# Patient Record
Sex: FEMALE | Race: WHITE | Employment: FULL TIME | URBAN - METROPOLITAN AREA
[De-identification: names, ages, dates, MRNs, and addresses within clinical notes are randomized per-mention and may not be internally consistent; named-entity substitution may affect disease eponyms.]

---

## 2017-01-29 ENCOUNTER — HOSPITAL ENCOUNTER (EMERGENCY)
Age: 27
Discharge: HOME | End: 2017-01-29
Attending: EMERGENCY MEDICINE
Payer: MEDICAID

## 2017-01-29 VITALS
OXYGEN SATURATION: 98 % | WEIGHT: 110.89 LBS | HEART RATE: 86 BPM | DIASTOLIC BLOOD PRESSURE: 67 MMHG | SYSTOLIC BLOOD PRESSURE: 96 MMHG | RESPIRATION RATE: 16 BRPM | BODY MASS INDEX: 17.82 KG/M2 | TEMPERATURE: 97.7 F | HEIGHT: 66 IN

## 2017-01-29 DIAGNOSIS — L02.91 ABSCESS: Primary | ICD-10-CM

## 2017-01-29 PROCEDURE — 99282 EMERGENCY DEPT VISIT SF MDM: CPT

## 2017-01-29 PROCEDURE — 75810000289 HC I&D ABSCESS SIMP/COMP/MULT

## 2017-01-29 RX ORDER — OXYCODONE AND ACETAMINOPHEN 5; 325 MG/1; MG/1
1 TABLET ORAL
Qty: 10 TAB | Refills: 0 | Status: SHIPPED | OUTPATIENT
Start: 2017-01-29 | End: 2017-04-04

## 2017-01-29 RX ORDER — DOXYCYCLINE HYCLATE 100 MG
100 TABLET ORAL 2 TIMES DAILY
Qty: 14 TAB | Refills: 0 | Status: SHIPPED | OUTPATIENT
Start: 2017-01-29 | End: 2017-02-05

## 2017-01-29 NOTE — DISCHARGE INSTRUCTIONS

## 2017-01-29 NOTE — ED PROVIDER NOTES
HPI Comments: Augusto Ortega is a 33 y.o. female who presents ambulatory to [facility] ED with cc of progressively worsening redness, swelling, and pain to the skin of the right upper back. She states that symptoms have been worsening for the past 6 months, but \"tripled in size\" over the past week. Pt denies any drainage from the area and notes no episodes of similar symptoms in the past. She states that pain is exacerbated by palpation, but she attempted several times \"to squeeze and pop it,\" with no relief. Pt specifically denies any abdominal pain, vomiting, fever, chills, or chest pain. PCP:None    PMHx: asthma  Social Hx: + smoking; - EtOH; - illicit drug use    There are no other complains, changes, or physical findings at this time. The history is provided by the patient. No  was used. Past Medical History:   Diagnosis Date    Abnormal Papanicolaou smear of cervix      no tx required    Asthma      last attack 5 months ago    Other unknown and unspecified cause of morbidity or mortality      Heroin use in early pregnancy       Past Surgical History:   Procedure Laterality Date    Hx other surgical       wisdom teeth    Hx other surgical       d&c 2011         History reviewed. No pertinent family history. Social History     Social History    Marital status: SINGLE     Spouse name: N/A    Number of children: N/A    Years of education: N/A     Occupational History    Not on file.      Social History Main Topics    Smoking status: Current Every Day Smoker     Packs/day: 0.25     Years: 2.00     Last attempt to quit: 3/9/2015    Smokeless tobacco: Never Used    Alcohol use No    Drug use: Yes     Special: Heroin      Comment: In early pregnancy, switched to suboxone around 20 weeks    Sexual activity: Yes     Partners: Male     Other Topics Concern    Not on file     Social History Narrative         ALLERGIES: Sulfa (sulfonamide antibiotics)    Review of Systems Constitutional: Negative for chills and fever. HENT: Negative for congestion. Eyes: Negative for visual disturbance. Respiratory: Negative for chest tightness. Cardiovascular: Negative for chest pain and leg swelling. Gastrointestinal: Negative for abdominal pain and vomiting. Endocrine: Negative for polyuria. Genitourinary: Negative for dysuria and frequency. Musculoskeletal: Negative for myalgias. Skin: Positive for color change. Allergic/Immunologic: Negative for immunocompromised state. Neurological: Negative for numbness. Vitals:    01/29/17 1505   BP: 96/67   Pulse: 86   Resp: 16   Temp: 97.7 °F (36.5 °C)   SpO2: 98%   Weight: 50.3 kg (110 lb 14.3 oz)   Height: 5' 6\" (1.676 m)            Physical Exam   Constitutional: She is oriented to person, place, and time. She appears well-developed and well-nourished. No distress. HENT:   Head: Normocephalic and atraumatic. Right Ear: External ear normal.   Left Ear: External ear normal.   Nose: Nose normal.   Mouth/Throat: Oropharynx is clear and moist. No oropharyngeal exudate. Eyes: Conjunctivae and EOM are normal. Pupils are equal, round, and reactive to light. Right eye exhibits no discharge. Left eye exhibits no discharge. No scleral icterus. Neck: Normal range of motion. Neck supple. No JVD present. No tracheal deviation present. Cardiovascular: Normal rate, regular rhythm, normal heart sounds and intact distal pulses. Exam reveals no gallop and no friction rub. No murmur heard. Pulmonary/Chest: Effort normal and breath sounds normal. No respiratory distress. She has no wheezes. She has no rales. She exhibits no tenderness. Abdominal: Soft. Bowel sounds are normal. She exhibits no distension and no mass. There is no tenderness. There is no rebound and no guarding. Musculoskeletal: Normal range of motion. She exhibits no edema or tenderness. Lymphadenopathy:     She has no cervical adenopathy.    Neurological: She is alert and oriented to person, place, and time. She has normal reflexes. No cranial nerve deficit. She exhibits normal muscle tone. Coordination normal.   Skin: Skin is warm and dry. She is not diaphoretic. Fluctuant mass to the right shoulder, consistent with an abscess   Psychiatric: She has a normal mood and affect. Her behavior is normal. Judgment and thought content normal.   Nursing note and vitals reviewed. MDM  Number of Diagnoses or Management Options  Abscess:   Diagnosis management comments: DDx: abscess, cellulitis, folliculitis       Amount and/or Complexity of Data Reviewed  Review and summarize past medical records: yes    Patient Progress  Patient progress: stable    ED Course       Procedures    Procedure Note - Incision and Drainage:   3:23 PM  Performed by: [employer]  Complexity: complex  Skin prepped with Betadine. Sterile field established. Anesthesia achieved with 7 mLs of 50-50 Lidocaine 1% with epinephrine and Bupivacaine 0.5% with epinephrine using a local infiltration. Abscess to back was incised with # 11 blade, and a copious amount of purulent green drainage was expressed. Wound probed and irrigated. Area was packed using 0.25 inch iodoform gauze. Sterile dressing applied. Estimated blood loss: scant  The procedure took 1-15 minutes, and pt tolerated well. Written by Patria Guthrie ED Scribhannah, as dictated by [employer] VITALS:  Patient Vitals for the past 12 hrs:   Temp Pulse Resp BP SpO2   01/29/17 1505 97.7 °F (36.5 °C) 86 16 96/67 98 %     IMPRESSION:  1. Abscess        PLAN:  1. Current Discharge Medication List      START taking these medications    Details   oxyCODONE-acetaminophen (PERCOCET) 5-325 mg per tablet Take 1 Tab by mouth every six (6) hours as needed for Pain. Max Daily Amount: 4 Tabs. Qty: 10 Tab, Refills: 0      doxycycline (VIBRA-TABS) 100 mg tablet Take 1 Tab by mouth two (2) times a day for 7 days.   Qty: 14 Tab, Refills: 0           2. Follow-up Information     Follow up With Details Comments Contact Info    None In 2 days As needed None (395) Patient stated that they have no PCP      None In 2 days As needed None (395) Patient stated that they have no PCP          3. Return to ED if worse     Discharge Note:  3:36 PM  The patient has been re-evaluated and is ready for discharge. Reviewed available results with patient. Counseled patient on diagnosis and care plan. Patient has expressed understanding, and all questions have been answered. Patient agrees with plan and agrees to follow up as recommended, or to return to the ED if their symptoms worsen. Discharge instructions have been provided and explained to the patient, along with reasons to return to the ED. This note is prepared by Senthil Hale, acting as Scribe for [employer] MANNY Platt: The scribe's documentation has been prepared under my direction and personally reviewed by me in its entirety. I confirm that the note above accurately reflects all work, treatment, procedures, and medical decision making performed by me.

## 2017-01-29 NOTE — ED NOTES
Job Cano [place] reviewed discharge instructions with the patient. The patient verbalized understanding. All questions and concerns were addressed. The patient is discharged ambulatory in the care of family members with instructions and prescriptions in hand. Pt is alert and oriented x 4. Respirations are clear and unlabored.

## 2017-04-04 ENCOUNTER — HOSPITAL ENCOUNTER (EMERGENCY)
Age: 27
Discharge: HOME | End: 2017-04-04
Attending: EMERGENCY MEDICINE
Payer: COMMERCIAL

## 2017-04-04 VITALS
RESPIRATION RATE: 18 BRPM | BODY MASS INDEX: 16.62 KG/M2 | SYSTOLIC BLOOD PRESSURE: 123 MMHG | TEMPERATURE: 99.4 F | OXYGEN SATURATION: 100 % | DIASTOLIC BLOOD PRESSURE: 87 MMHG | HEART RATE: 115 BPM | WEIGHT: 103.4 LBS | HEIGHT: 66 IN

## 2017-04-04 DIAGNOSIS — B34.9 VIRAL SYNDROME: ICD-10-CM

## 2017-04-04 DIAGNOSIS — R19.7 NAUSEA VOMITING AND DIARRHEA: Primary | ICD-10-CM

## 2017-04-04 DIAGNOSIS — R11.2 NAUSEA VOMITING AND DIARRHEA: Primary | ICD-10-CM

## 2017-04-04 LAB
ALBUMIN SERPL BCP-MCNC: 4.2 G/DL (ref 3.5–5)
ALBUMIN/GLOB SERPL: 1 {RATIO} (ref 1.1–2.2)
ALP SERPL-CCNC: 126 U/L (ref 45–117)
ALT SERPL-CCNC: 19 U/L (ref 12–78)
ANION GAP BLD CALC-SCNC: 10 MMOL/L (ref 5–15)
APPEARANCE UR: ABNORMAL
AST SERPL W P-5'-P-CCNC: 16 U/L (ref 15–37)
BACTERIA URNS QL MICRO: ABNORMAL /HPF
BASOPHILS # BLD AUTO: 0 K/UL (ref 0–0.1)
BASOPHILS # BLD: 0 % (ref 0–1)
BILIRUB SERPL-MCNC: 0.5 MG/DL (ref 0.2–1)
BILIRUB UR QL: NEGATIVE
BUN SERPL-MCNC: 14 MG/DL (ref 6–20)
BUN/CREAT SERPL: 18 (ref 12–20)
CALCIUM SERPL-MCNC: 9.7 MG/DL (ref 8.5–10.1)
CHLORIDE SERPL-SCNC: 105 MMOL/L (ref 97–108)
CO2 SERPL-SCNC: 26 MMOL/L (ref 21–32)
COLOR UR: ABNORMAL
CREAT SERPL-MCNC: 0.8 MG/DL (ref 0.55–1.02)
EOSINOPHIL # BLD: 0 K/UL (ref 0–0.4)
EOSINOPHIL NFR BLD: 0 % (ref 0–7)
EPITH CASTS URNS QL MICRO: ABNORMAL /LPF
ERYTHROCYTE [DISTWIDTH] IN BLOOD BY AUTOMATED COUNT: 13.8 % (ref 11.5–14.5)
FLUAV AG NPH QL IA: NEGATIVE
FLUBV AG NOSE QL IA: NEGATIVE
GLOBULIN SER CALC-MCNC: 4.3 G/DL (ref 2–4)
GLUCOSE SERPL-MCNC: 106 MG/DL (ref 65–100)
GLUCOSE UR STRIP.AUTO-MCNC: NEGATIVE MG/DL
HCG UR QL: NEGATIVE
HCT VFR BLD AUTO: 42.5 % (ref 35–47)
HGB BLD-MCNC: 14.3 G/DL (ref 11.5–16)
HGB UR QL STRIP: NEGATIVE
KETONES UR QL STRIP.AUTO: NEGATIVE MG/DL
LEUKOCYTE ESTERASE UR QL STRIP.AUTO: ABNORMAL
LYMPHOCYTES # BLD AUTO: 23 % (ref 12–49)
LYMPHOCYTES # BLD: 1.7 K/UL (ref 0.8–3.5)
MCH RBC QN AUTO: 28.7 PG (ref 26–34)
MCHC RBC AUTO-ENTMCNC: 33.6 G/DL (ref 30–36.5)
MCV RBC AUTO: 85.2 FL (ref 80–99)
MONOCYTES # BLD: 0.7 K/UL (ref 0–1)
MONOCYTES NFR BLD AUTO: 9 % (ref 5–13)
MUCOUS THREADS URNS QL MICRO: ABNORMAL /LPF
NEUTS SEG # BLD: 4.9 K/UL (ref 1.8–8)
NEUTS SEG NFR BLD AUTO: 68 % (ref 32–75)
NITRITE UR QL STRIP.AUTO: NEGATIVE
PH UR STRIP: 6.5 [PH] (ref 5–8)
PLATELET # BLD AUTO: 208 K/UL (ref 150–400)
POTASSIUM SERPL-SCNC: 4 MMOL/L (ref 3.5–5.1)
PROT SERPL-MCNC: 8.5 G/DL (ref 6.4–8.2)
PROT UR STRIP-MCNC: ABNORMAL MG/DL
RBC # BLD AUTO: 4.99 M/UL (ref 3.8–5.2)
RBC #/AREA URNS HPF: ABNORMAL /HPF (ref 0–5)
SODIUM SERPL-SCNC: 141 MMOL/L (ref 136–145)
SP GR UR REFRACTOMETRY: 1.02 (ref 1–1.03)
UROBILINOGEN UR QL STRIP.AUTO: 1 EU/DL (ref 0.2–1)
WBC # BLD AUTO: 7.3 K/UL (ref 3.6–11)
WBC URNS QL MICRO: ABNORMAL /HPF (ref 0–4)

## 2017-04-04 PROCEDURE — 74011250637 HC RX REV CODE- 250/637: Performed by: EMERGENCY MEDICINE

## 2017-04-04 PROCEDURE — 81025 URINE PREGNANCY TEST: CPT | Performed by: EMERGENCY MEDICINE

## 2017-04-04 PROCEDURE — 74011250636 HC RX REV CODE- 250/636: Performed by: EMERGENCY MEDICINE

## 2017-04-04 PROCEDURE — 87804 INFLUENZA ASSAY W/OPTIC: CPT | Performed by: EMERGENCY MEDICINE

## 2017-04-04 PROCEDURE — 36415 COLL VENOUS BLD VENIPUNCTURE: CPT | Performed by: EMERGENCY MEDICINE

## 2017-04-04 PROCEDURE — 81001 URINALYSIS AUTO W/SCOPE: CPT | Performed by: EMERGENCY MEDICINE

## 2017-04-04 PROCEDURE — 99283 EMERGENCY DEPT VISIT LOW MDM: CPT

## 2017-04-04 PROCEDURE — 85025 COMPLETE CBC W/AUTO DIFF WBC: CPT | Performed by: EMERGENCY MEDICINE

## 2017-04-04 PROCEDURE — 80053 COMPREHEN METABOLIC PANEL: CPT | Performed by: EMERGENCY MEDICINE

## 2017-04-04 PROCEDURE — 96375 TX/PRO/DX INJ NEW DRUG ADDON: CPT

## 2017-04-04 PROCEDURE — 96361 HYDRATE IV INFUSION ADD-ON: CPT

## 2017-04-04 PROCEDURE — 96374 THER/PROPH/DIAG INJ IV PUSH: CPT

## 2017-04-04 RX ORDER — MORPHINE SULFATE 4 MG/ML
4 INJECTION, SOLUTION INTRAMUSCULAR; INTRAVENOUS
Status: COMPLETED | OUTPATIENT
Start: 2017-04-04 | End: 2017-04-04

## 2017-04-04 RX ORDER — KETOROLAC TROMETHAMINE 30 MG/ML
15 INJECTION, SOLUTION INTRAMUSCULAR; INTRAVENOUS
Status: COMPLETED | OUTPATIENT
Start: 2017-04-04 | End: 2017-04-04

## 2017-04-04 RX ORDER — BUTALBITAL, ACETAMINOPHEN AND CAFFEINE 50; 325; 40 MG/1; MG/1; MG/1
2 TABLET ORAL
Status: COMPLETED | OUTPATIENT
Start: 2017-04-04 | End: 2017-04-04

## 2017-04-04 RX ORDER — BUTALBITAL, ACETAMINOPHEN AND CAFFEINE 300; 40; 50 MG/1; MG/1; MG/1
1 CAPSULE ORAL
Qty: 12 CAP | Refills: 0 | Status: SHIPPED | OUTPATIENT
Start: 2017-04-04 | End: 2018-07-20

## 2017-04-04 RX ORDER — DIPHENHYDRAMINE HYDROCHLORIDE 50 MG/ML
12.5 INJECTION, SOLUTION INTRAMUSCULAR; INTRAVENOUS
Status: COMPLETED | OUTPATIENT
Start: 2017-04-04 | End: 2017-04-04

## 2017-04-04 RX ORDER — SODIUM CHLORIDE 9 MG/ML
1000 INJECTION, SOLUTION INTRAVENOUS ONCE
Status: COMPLETED | OUTPATIENT
Start: 2017-04-04 | End: 2017-04-04

## 2017-04-04 RX ORDER — PROCHLORPERAZINE MALEATE 5 MG
5 TABLET ORAL
Qty: 12 TAB | Refills: 0 | Status: SHIPPED | OUTPATIENT
Start: 2017-04-04 | End: 2017-04-11

## 2017-04-04 RX ORDER — SODIUM CHLORIDE 9 MG/ML
2000 INJECTION, SOLUTION INTRAVENOUS ONCE
Status: COMPLETED | OUTPATIENT
Start: 2017-04-04 | End: 2017-04-04

## 2017-04-04 RX ORDER — METOCLOPRAMIDE HYDROCHLORIDE 5 MG/ML
10 INJECTION INTRAMUSCULAR; INTRAVENOUS
Status: COMPLETED | OUTPATIENT
Start: 2017-04-04 | End: 2017-04-04

## 2017-04-04 RX ORDER — NAPROXEN 375 MG/1
375 TABLET ORAL 2 TIMES DAILY WITH MEALS
Qty: 14 TAB | Refills: 0 | Status: SHIPPED | OUTPATIENT
Start: 2017-04-04 | End: 2017-04-11

## 2017-04-04 RX ADMIN — KETOROLAC TROMETHAMINE 15 MG: 30 INJECTION, SOLUTION INTRAMUSCULAR at 20:52

## 2017-04-04 RX ADMIN — SODIUM CHLORIDE 2000 ML: 900 INJECTION, SOLUTION INTRAVENOUS at 20:44

## 2017-04-04 RX ADMIN — SODIUM CHLORIDE 1000 ML: 900 INJECTION, SOLUTION INTRAVENOUS at 22:22

## 2017-04-04 RX ADMIN — BUTALBITAL, ACETAMINOPHEN AND CAFFEINE 2 TABLET: 50; 325; 40 TABLET ORAL at 22:20

## 2017-04-04 RX ADMIN — METOCLOPRAMIDE 10 MG: 5 INJECTION, SOLUTION INTRAMUSCULAR; INTRAVENOUS at 20:45

## 2017-04-04 RX ADMIN — DIPHENHYDRAMINE HYDROCHLORIDE 12.5 MG: 50 INJECTION, SOLUTION INTRAMUSCULAR; INTRAVENOUS at 20:49

## 2017-04-04 RX ADMIN — MORPHINE SULFATE 4 MG: 4 INJECTION, SOLUTION INTRAMUSCULAR; INTRAVENOUS at 21:31

## 2017-04-04 NOTE — LETTER
[address] 
[facility] EMERGENCY DEPT 
[address] [address] 
[phone] Work/School Note Date: 4/4/2017 To Whom It May concern: Julio Palomares was seen and treated today in the emergency room by the following provider(s): 
Attending Provider: Olivia Hernandez MD.   
 
Julio Palomares may return to work on 4/6/17. Sincerely, Olivia Hernandez MD

## 2017-04-05 NOTE — ED NOTES
Dr. Blake Lennox gave and reviewed discharge instructions with the patient. The patient verbalized understanding. The patient was given opportunity for questions. Patient discharged in stable condition to the waiting room.

## 2017-04-05 NOTE — DISCHARGE INSTRUCTIONS
Nausea and Vomiting: Care Instructions  Your Care Instructions    When you are nauseated, you may feel weak and sweaty and notice a lot of saliva in your mouth. Nausea often leads to vomiting. Most of the time you do not need to worry about nausea and vomiting, but they can be signs of other illnesses. Two common causes of nausea and vomiting are stomach flu and food poisoning. Nausea and vomiting from viral stomach flu will usually start to improve within 24 hours. Nausea and vomiting from food poisoning may last from 12 to 48 hours. The doctor has checked you carefully, but problems can develop later. If you notice any problems or new symptoms, get medical treatment right away. Follow-up care is a key part of your treatment and safety. Be sure to make and go to all appointments, and call your doctor if you are having problems. It's also a good idea to know your test results and keep a list of the medicines you take. How can you care for yourself at home? · To prevent dehydration, drink plenty of fluids, enough so that your urine is light yellow or clear like water. Choose water and other caffeine-free clear liquids until you feel better. If you have kidney, heart, or liver disease and have to limit fluids, talk with your doctor before you increase the amount of fluids you drink. · Rest in bed until you feel better. · When you are able to eat, try clear soups, mild foods, and liquids until all symptoms are gone for 12 to 48 hours. Other good choices include dry toast, crackers, cooked cereal, and gelatin dessert, such as Jell-O. When should you call for help? Call 911 anytime you think you may need emergency care. For example, call if:  · You passed out (lost consciousness). Call your doctor now or seek immediate medical care if:  · You have symptoms of dehydration, such as:  ¨ Dry eyes and a dry mouth. ¨ Passing only a little dark urine.   ¨ Feeling thirstier than usual.  · You have new or worsening belly pain. · You have a new or higher fever. · You vomit blood or what looks like coffee grounds. Watch closely for changes in your health, and be sure to contact your doctor if:  · You have ongoing nausea and vomiting. · Your vomiting is getting worse. · Your vomiting lasts longer than 2 days. · You are not getting better as expected. Where can you learn more? Go to http://bulmaro-katerin.info/. Enter 25 [id number] in the search box to learn more about \"Nausea and Vomiting: Care Instructions. \"  Current as of: May 27, 2016  Content Version: 11.2  © 3826-5002 [employer]. Care instructions adapted under license by [employer] (which disclaims liability or warranty for this information). If you have questions about a medical condition or this instruction, always ask your healthcare professional. [address] any warranty or liability for your use of this information. Diarrhea: Care Instructions  Your Care Instructions    Diarrhea is loose, watery stools (bowel movements). The exact cause is often hard to find. Sometimes diarrhea is your body's way of getting rid of what caused an upset stomach. Viruses, food poisoning, and many medicines can cause diarrhea. Some people get diarrhea in response to emotional stress, anxiety, or certain foods. Almost everyone has diarrhea now and then. It usually isn't serious, and your stools will return to normal soon. The important thing to do is replace the fluids you have lost, so you can prevent dehydration. The doctor has checked you carefully, but problems can develop later. If you notice any problems or new symptoms, get medical treatment right away. Follow-up care is a key part of your treatment and safety. Be sure to make and go to all appointments, and call your doctor if you are having problems. It's also a good idea to know your test results and keep a list of the medicines you take.   How can you care for yourself at home? · Watch for signs of dehydration, which means your body has lost too much water. Dehydration is a serious condition and should be treated right away. Signs of dehydration are:  ¨ Increasing thirst and dry eyes and mouth. ¨ Feeling faint or lightheaded. ¨ Darker urine, and a smaller amount of urine than normal.  · To prevent dehydration, drink plenty of fluids, enough so that your urine is light yellow or clear like water. Choose water and other caffeine-free clear liquids until you feel better. If you have kidney, heart, or liver disease and have to limit fluids, talk with your doctor before you increase the amount of fluids you drink. · Begin eating small amounts of mild foods the next day, if you feel like it. ¨ Try yogurt that has live cultures of Lactobacillus. (Check the label.)  ¨ Avoid spicy foods, fruits, alcohol, and caffeine until 48 hours after all symptoms are gone. ¨ Avoid chewing gum that contains sorbitol. ¨ Avoid dairy products (except for yogurt with Lactobacillus) while you have diarrhea and for 3 days after symptoms are gone. · The doctor may recommend that you take over-the-counter medicine, such as loperamide (Imodium), if you still have diarrhea after 6 hours. Read and follow all instructions on the label. Do not use this medicine if you have bloody diarrhea, a high fever, or other signs of serious illness. Call your doctor if you think you are having a problem with your medicine. When should you call for help? Call 911 anytime you think you may need emergency care. For example, call if:  · You passed out (lost consciousness). · Your stools are maroon or very bloody. Call your doctor now or seek immediate medical care if:  · You are dizzy or lightheaded, or you feel like you may faint. · Your stools are black and look like tar, or they have streaks of blood. · You have new or worse belly pain.   · You have symptoms of dehydration, such as:  ¨ Dry eyes and a dry mouth. ¨ Passing only a little dark urine. ¨ Feeling thirstier than usual.  · You have a new or higher fever. Watch closely for changes in your health, and be sure to contact your doctor if:  · Your diarrhea is getting worse. · You see pus in the diarrhea. · You are not getting better after 2 days (48 hours). Where can you learn more? Go to http://bulmaro-katerin.info/. Enter Z220 in the search box to learn more about \"Diarrhea: Care Instructions. \"  Current as of: May 27, 2016  Content Version: 11.2  © 7451-5749 [employer] (ML4D). Care instructions adapted under license by [employer] (which disclaims liability or warranty for this information). If you have questions about a medical condition or this instruction, always ask your healthcare professional. Sheyla Andrade any warranty or liability for your use of this information.    =======================================================================    [facility] HOSPITAL SYSTEMS Departments     For adult and child immunizations, family planning, TB screening, STD testing and women's health services. [place]: Miguel [phone]      [facility] 25   [address] [facility]   [address]   170 [facility]: [facility] 200 [facility] [phone]      [facility]          [address] [address]     For primary care services, woman and child wellness, and some clinics providing specialty care. [facility] -- 1011 [facility]. [address] [place] [address] [phone]   [address] [place] [address] [facility] 200 [facility] [address] 3614 [facility] [phone]   [address] [place] [address] [address] [place] [address] [phone]   [address] [facility] [employer] [address] [place] [address]  [phone]   [address] [place] [address]  [address] [address] [phone]   [facility] 81 [facility] [phone]   Kinsey Shine the Less [facility] 125 [address], [address]   [facility]: [facility] [address] [facility] [address] [phone], ext Krishna 79 [facility] [phone]     Cathy Molina [address] [phone] [address] [phone]   [employer]  [address], [address] ([email or web address]/[email or web address]) [phone]         Sexual Health/Woman Wellness Clinics    For STD/HIV testing and treatment, pregnancy testing and services, men's health, birth control services, LGBT services, and hepatitis/HPV vaccine services. [employer] for [place] [employer] [address] [facility] [address] [facility] [address] [facility] 1579 [address] [place] [phone]   [facility] [address], 5th floor [phone]   Pregnancy 3928 [facility] [address] for Women [address]  [facility] [phone]         [place] [address]956 [facility] [address] [place] [address]   [phone]   [place]   [phone]   Women, Infant and Children's Services: [address] [phone]       [address] [phone]   [facility]   [phone]   [facility]   [phone]   [facility]     [phone]   [address] Crisis   1212 [facility] [phone]     Local Primary Care Physicians  64 [facility] [phone]  MD Chris Britton MD Phares Rimes, MD [facility] [phone]  CHINA Sood MD Carmelina Glazier, MD Markel Hugh, MD [address]  [phone]  [place] [address] [facility] [phone]  MD Leticia Sy MD Tony Nest, MD Osker Free, MD [facility] [phone]  MISTY MCDANIELS, MD Alecia Gallardo, MD Jazmine Hernandez, NP [place] [phone]  Viola Simons, MD Lety Chavarria, MD Henrietta Jarquin, MD Abe Wallace, MD Debbi Steele, MD Elvin Vaz, MD Spenser Molina, MD   33 57 [facility] Reagan, MD [facility] [place] [phone]  Salina Cuellar, MD Codie Merlos, BRANNON Manzanares, MD Titus Leong, MD Liang Rodriguez, MD Imelda Tolentino, MD Romero Brock, MD   2342 [facility] [phone]  Brigette Ely, MD Otilio Powell, FNP  Clara Goetz, BRANNON Dillon, MD Natalie Díaz, MD Jeimy Hancock, MD Elyse Garcia, MD TINSLEY [facility] [phone]  Nj Austin, MD Jose Coates, MD Bharat Clement, MD Senia Palafox, MD Aniket Dockery MD   [address] [phone]  Elliot Van, MD Allison Perry, MD [place] [phone]  Jayden Day, MD Allegra Downs, MD Brittany Ivory MD   [facility] [phone]  Kristina Toledo, MD Olu Tamayo, MD Stephanie Murcia, MD Nely Treviño, MD Jung Perkins, MD Berta Carrera, NP  Binh Whitman MD 1619  66   [phone]  MD Loulou Pagan, MD Ashley Al MD   2102 [facility] [phone]  Ishan Gr, MD Aubrie Clemente, FNP  Julious Laser, PA-C  Julious Laser, FNP  Hilton Guallpa, PA-C  Lia Lofton, MD Mingo Whitaker, NP   Vida Llanos, DO Miscellaneous:  Telford Riedel, [phone]

## 2017-04-05 NOTE — ED PROVIDER NOTES
HPI Comments: Julio Palomares is a 33 y.o. female with PMHx significant for asthma, who presents ambulatory to [facility] ED with cc of generalized body aches, subjective fever, vomiting and HA x 6 days. Patient reports 6 episodes of bilious emesis today. She notes diarrhea initially, which has improved. Patient reports taking Tylenol in the morning with no relief. She states she has been unable to sleep for the past 3 nights. Patient reports both her son and dad had similar symptoms, but states they improved after 2 days. She denies any recent travel or a history of abdominal surgery. Patient specifically denies any dysuria, sore throat, ear pain, sinus pressure, congestion, or abdominal pain. Social History: (+) Tobacco, (-) EtOH, (+) Illicit Drugs       There are no other complaints, changes, or physical findings at this time. Written by FRANK Rouse, as dictated by Olivia Hernandez MD.       The history is provided by the patient. No  was used. Past Medical History:   Diagnosis Date    Abnormal Papanicolaou smear of cervix     no tx required    Asthma     last attack 5 months ago    Other unknown and unspecified cause of morbidity or mortality     Heroin use in early pregnancy       Past Surgical History:   Procedure Laterality Date    HX OTHER SURGICAL      wisdom teeth    HX OTHER SURGICAL      d&c 2011         History reviewed. No pertinent family history. Social History     Social History    Marital status: SINGLE     Spouse name: N/A    Number of children: N/A    Years of education: N/A     Occupational History    Not on file.      Social History Main Topics    Smoking status: Current Every Day Smoker     Packs/day: 0.50     Years: 2.00     Last attempt to quit: 3/9/2015    Smokeless tobacco: Never Used    Alcohol use No    Drug use: Yes     Special: Heroin      Comment: In early pregnancy, switched to suboxone around 20 weeks    Sexual activity: Yes Partners: Male     Other Topics Concern    Not on file     Social History Narrative         ALLERGIES: Sulfa (sulfonamide antibiotics)    Review of Systems   Constitutional: Positive for fever. Negative for chills. Positive for generalized body aches   HENT: Negative for congestion, ear pain and sore throat. Eyes: Negative for visual disturbance. Respiratory: Negative for chest tightness. Cardiovascular: Negative for chest pain and leg swelling. Gastrointestinal: Positive for vomiting. Negative for abdominal pain. Endocrine: Negative for polyuria. Genitourinary: Negative for dysuria and frequency. Musculoskeletal: Negative for myalgias. Skin: Negative for color change. Allergic/Immunologic: Negative for immunocompromised state. Neurological: Positive for headaches. Negative for numbness. No data found. Patient Vitals for the past 24 hrs:   Temp Pulse Resp BP SpO2   04/04/17 2321 - - - - 100 %   04/04/17 2300 - - - 123/87 100 %   04/04/17 2230 - - - - 100 %   04/04/17 2200 - - - 118/76 99 %   04/04/17 2130 - - - 122/84 97 %   04/04/17 2107 - - - - 96 %   04/04/17 2041 99.4 °F (37.4 °C) - - - -   04/04/17 2035 - - - - 98 %   04/04/17 2034 - - - (!) 124/91 -   04/04/17 1749 98.9 °F (37.2 °C) (!) 115 18 131/77 94 %         Physical Exam     Nursing note and vitals reviewed.   General appearance: non-toxic, appears mildly uncomfortable  Eyes: PERRL, EOMI, conjunctiva normal, anicteric sclera  HEENT: mucous membranes slightly dry, oropharynx is clear, neck is supple  Pulmonary: clear to auscultation bilaterally  Cardiac: tachycardic and regular rhythm, no murmurs, gallops, or rubs, 2+ peripheral pulses, cap refill < 2 seconds  Abdomen: soft, mild non-ttp R abdomen, nondistended, bowel sounds present, no rebound or guarding, no CVA TTP  MSK: no lower extremity edema  Neuro: Alert, answers questions appropriately, 5/5 strength in all 4 extremities, VFF, finger to nose normal, light touch intact in all four extremities, cranial nerves II-XII intact, no rash        MDM  Number of Diagnoses or Management Options  Nausea vomiting and diarrhea:   Viral syndrome:   Diagnosis management comments: DDx: viral syndrome, UTI, influenza, gastroenteritis, lower suspicion for appendicitis or CNS infection. Improved after Rx in ED; labs reviewed, very low suspicion for surgical abdomen or significant CNS pathology. Given clinical picture, suspect viral syndrome. Pt denies abdominal pain, tolerated po, back pain/myalgias/HA improved. Given recent sick contacts, continue supportive therapy, return for any worsening symptoms or fever. Pt agrees with POC and verbalizes understanding of when to return to the ED. Amount and/or Complexity of Data Reviewed  Clinical lab tests: reviewed and ordered  Review and summarize past medical records: yes    Patient Progress  Patient progress: stable    ED Course       Procedures    PROGRESS NOTE  10:15 PM   Patient feels improved after Toradol and Reglan. Tolerated PO challenge without vomiting. Will give 1 additional fluid bolus. Written by Delores Emery ED Scribe, as dictated by Jenny Henriquez. Lisa Warner MD.    PROGRESS NOTE  10:54 PM   Patient states she is feeling better and is ready to go home. Very well appearing. HR 90 on re-examination. Written by Delores Emery ED Scribe, as dictated by Jenny Henriquze.  Lisa Warner MD.       LABORATORY TESTS:  Recent Results (from the past 12 hour(s))   INFLUENZA A & B AG (RAPID TEST)    Collection Time: 04/04/17  5:52 PM   Result Value Ref Range    Influenza A Antigen NEGATIVE  NEG      Influenza B Antigen NEGATIVE  NEG     URINALYSIS W/ RFLX MICROSCOPIC    Collection Time: 04/04/17  5:52 PM   Result Value Ref Range    Color YELLOW/STRAW      Appearance CLOUDY (A) CLEAR      Specific gravity 1.025 1.003 - 1.030      pH (UA) 6.5 5.0 - 8.0      Protein TRACE (A) NEG mg/dL    Glucose NEGATIVE  NEG mg/dL    Ketone NEGATIVE  NEG mg/dL Bilirubin NEGATIVE  NEG      Blood NEGATIVE  NEG      Urobilinogen 1.0 0.2 - 1.0 EU/dL    Nitrites NEGATIVE  NEG      Leukocyte Esterase SMALL (A) NEG      WBC 0-4 0 - 4 /hpf    RBC 0-5 0 - 5 /hpf    Epithelial cells FEW FEW /lpf    Bacteria 1+ (A) NEG /hpf    Mucus 1+ (A) NEG /lpf   HCG URINE, QL    Collection Time: 04/04/17  5:52 PM   Result Value Ref Range    HCG urine, Ql. NEGATIVE  NEG     CBC WITH AUTOMATED DIFF    Collection Time: 04/04/17  6:05 PM   Result Value Ref Range    WBC 7.3 3.6 - 11.0 K/uL    RBC 4.99 3.80 - 5.20 M/uL    HGB 14.3 11.5 - 16.0 g/dL    HCT 42.5 35.0 - 47.0 %    MCV 85.2 80.0 - 99.0 FL    MCH 28.7 26.0 - 34.0 PG    MCHC 33.6 30.0 - 36.5 g/dL    RDW 13.8 11.5 - 14.5 %    PLATELET 139 639 - 490 K/uL    NEUTROPHILS 68 32 - 75 %    LYMPHOCYTES 23 12 - 49 %    MONOCYTES 9 5 - 13 %    EOSINOPHILS 0 0 - 7 %    BASOPHILS 0 0 - 1 %    ABS. NEUTROPHILS 4.9 1.8 - 8.0 K/UL    ABS. LYMPHOCYTES 1.7 0.8 - 3.5 K/UL    ABS. MONOCYTES 0.7 0.0 - 1.0 K/UL    ABS. EOSINOPHILS 0.0 0.0 - 0.4 K/UL    ABS. BASOPHILS 0.0 0.0 - 0.1 K/UL   METABOLIC PANEL, COMPREHENSIVE    Collection Time: 04/04/17  6:05 PM   Result Value Ref Range    Sodium 141 136 - 145 mmol/L    Potassium 4.0 3.5 - 5.1 mmol/L    Chloride 105 97 - 108 mmol/L    CO2 26 21 - 32 mmol/L    Anion gap 10 5 - 15 mmol/L    Glucose 106 (H) 65 - 100 mg/dL    BUN 14 6 - 20 MG/DL    Creatinine 0.80 0.55 - 1.02 MG/DL    BUN/Creatinine ratio 18 12 - 20      GFR est AA >60 >60 ml/min/1.73m2    GFR est non-AA >60 >60 ml/min/1.73m2    Calcium 9.7 8.5 - 10.1 MG/DL    Bilirubin, total 0.5 0.2 - 1.0 MG/DL    ALT (SGPT) 19 12 - 78 U/L    AST (SGOT) 16 15 - 37 U/L    Alk.  phosphatase 126 (H) 45 - 117 U/L    Protein, total 8.5 (H) 6.4 - 8.2 g/dL    Albumin 4.2 3.5 - 5.0 g/dL    Globulin 4.3 (H) 2.0 - 4.0 g/dL    A-G Ratio 1.0 (L) 1.1 - 2.2         MEDICATIONS GIVEN:  Medications   0.9% sodium chloride infusion 2,000 mL (2,000 mL IntraVENous New Bag 4/4/17 2044) ketorolac (TORADOL) injection 15 mg (15 mg IntraVENous Given 4/4/17 2052)   metoclopramide HCl (REGLAN) injection 10 mg (10 mg IntraVENous Given 4/4/17 2045)   diphenhydrAMINE (BENADRYL) injection 12.5 mg (12.5 mg IntraVENous Given 4/4/17 2049)   morphine injection 4 mg (4 mg IntraVENous Given 4/4/17 2131)   0.9% sodium chloride infusion 1,000 mL (1,000 mL IntraVENous New Bag 4/4/17 2222)   butalbital-acetaminophen-caffeine (FIORICET, ESGIC) -40 mg per tablet 2 Tab (2 Tabs Oral Given 4/4/17 2220)       IMPRESSION:  1. Nausea vomiting and diarrhea    2. Viral syndrome        PLAN:  1. Current Discharge Medication List      START taking these medications    Details   butalbital-acetaminophen-caff (FIORICET) -40 mg per capsule Take 1 Cap by mouth every six (6) hours as needed for Pain or Headache. Max Daily Amount: 4 Caps. Indications: TENSION-TYPE HEADACHE, DO NOT COMBINE WITH OTHER TYLENOL CONTAINING PRODUCTS  Qty: 12 Cap, Refills: 0      naproxen (NAPROSYN) 375 mg tablet Take 1 Tab by mouth two (2) times daily (with meals) for 7 days. Indications: Pain, take with food or milk  Qty: 14 Tab, Refills: 0      prochlorperazine (COMPAZINE) 5 mg tablet Take 1 Tab by mouth every eight (8) hours as needed for Nausea for up to 7 days. Indications: NAUSEA AND VOMITING, STOP TAKING IF YOU DEVELOP UNCONTROLLABLE MUSCLE MOVEMENTS  Qty: 12 Tab, Refills: 0           2. Follow-up Information     Follow up With Details Comments Contact Info    PRIMARY CARE DOCTOR Schedule an appointment as soon as possible for a visit in 3 days  SEE ATTACHED LIST    [facility] Go in 1 day If symptoms worsen [address] [place] [address]  [phone]          Return to ED if worse     Discharge Note:  11:01 PM  The patient has been re-evaluated and is ready for discharge. Reviewed available results with patient. Counseled patient on diagnosis and care plan.  Patient has expressed understanding, and all questions have been answered. Patient agrees with plan and agrees to follow up as recommended, or to return to the ED if their symptoms worsen. Discharge instructions have been provided and explained to the patient, along with reasons to return to the ED. Written by Victoria Lepe, ED Scribe, as dictated by Jay Jay Nevarez. Yoli Burnham MD.    This note is prepared by Victoria Lepe, acting as Scribe for Jay Jay Nevarez. Yoli Burnham MD.    Jay Jay Nevarez. Yoli Burnham MD: The scribe's documentation has been prepared under my direction and personally reviewed by me in its entirety. I confirm that the note above accurately reflects all work, treatment, procedures, and medical decision making performed by me.

## 2017-10-12 ENCOUNTER — HOSPITAL ENCOUNTER (EMERGENCY)
Age: 27
Discharge: HOME | End: 2017-10-13
Attending: EMERGENCY MEDICINE
Payer: COMMERCIAL

## 2017-10-12 VITALS
HEART RATE: 104 BPM | WEIGHT: 109.6 LBS | RESPIRATION RATE: 16 BRPM | TEMPERATURE: 99.6 F | SYSTOLIC BLOOD PRESSURE: 132 MMHG | DIASTOLIC BLOOD PRESSURE: 73 MMHG | OXYGEN SATURATION: 96 % | HEIGHT: 66 IN | BODY MASS INDEX: 17.61 KG/M2

## 2017-10-12 DIAGNOSIS — F19.10 SUBSTANCE ABUSE (HCC): Primary | ICD-10-CM

## 2017-10-12 PROCEDURE — 90791 PSYCH DIAGNOSTIC EVALUATION: CPT

## 2017-10-12 PROCEDURE — 99282 EMERGENCY DEPT VISIT SF MDM: CPT

## 2017-10-13 RX ORDER — LORAZEPAM 1 MG/1
1 TABLET ORAL
Status: DISCONTINUED | OUTPATIENT
Start: 2017-10-13 | End: 2017-10-13 | Stop reason: HOSPADM

## 2017-10-13 NOTE — ED PROVIDER NOTES
HPI Comments: 32 yo female with hx of asthma and substance abuse here for mental health evaluation. States she was kicked out of her last recovery house and is unable to get back into her prior recovery house. States \"I went balls to the wall\" doing drugs this past week. Admits to crack, herion and alcohol. Last used \"crack\" just PTA. States she is having and anxiety attack. Denies fever, CP, SOB, abd pain, flank pain, urinary symptoms. +Smoker. Patient is a 33 y.o. female presenting with mental health disorder. The history is provided by the patient. Mental Health Problem    This is a recurrent problem. Pertinent negatives include no seizures. Past Medical History:   Diagnosis Date    Abnormal Papanicolaou smear of cervix     no tx required    Asthma     last attack 5 months ago    Other unknown and unspecified cause of morbidity or mortality     Heroin use in early pregnancy       Past Surgical History:   Procedure Laterality Date    HX OTHER SURGICAL      wisdom teeth    HX OTHER SURGICAL      d&c 2011         History reviewed. No pertinent family history. Social History     Social History    Marital status: SINGLE     Spouse name: N/A    Number of children: N/A    Years of education: N/A     Occupational History    Not on file. Social History Main Topics    Smoking status: Current Every Day Smoker     Packs/day: 0.50     Years: 2.00     Last attempt to quit: 3/9/2015    Smokeless tobacco: Never Used    Alcohol use No    Drug use: Yes     Special: Heroin      Comment: In early pregnancy, switched to suboxone around 20 weeks    Sexual activity: Yes     Partners: Male     Other Topics Concern    Not on file     Social History Narrative         ALLERGIES: Sulfa (sulfonamide antibiotics)    Review of Systems   Constitutional: Negative for activity change and fever. HENT: Negative for facial swelling. Eyes: Negative for discharge. Respiratory: Negative for cough. Cardiovascular: Negative for leg swelling. Gastrointestinal: Negative for abdominal distention. Skin: Negative for color change. Neurological: Negative for seizures and syncope. Psychiatric/Behavioral: Positive for behavioral problems. The patient is nervous/anxious and is hyperactive. Vitals:    10/12/17 2307   BP: 132/73   Pulse: (!) 104   Resp: 16   Temp: 99.6 °F (37.6 °C)   SpO2: 96%   Weight: 49.7 kg (109 lb 9.6 oz)   Height: 5' 6\" (1.676 m)            Physical Exam   Constitutional: She is oriented to person, place, and time. She appears well-developed and well-nourished. No distress. HENT:   Head: Normocephalic and atraumatic. Right Ear: External ear normal.   Left Ear: External ear normal.   Nose: Nose normal.   Mouth/Throat: Oropharynx is clear and moist.   Eyes: Conjunctivae and EOM are normal. Pupils are equal, round, and reactive to light. Right eye exhibits no discharge. Left eye exhibits no discharge. Neck: Normal range of motion. Neck supple. Cardiovascular: Normal rate, regular rhythm, normal heart sounds and intact distal pulses. Pulmonary/Chest: Effort normal and breath sounds normal.   Abdominal: Soft. Bowel sounds are normal. She exhibits no distension. There is no tenderness. There is no rebound and no guarding. Musculoskeletal: Normal range of motion. She exhibits no edema or tenderness. Neurological: She is alert and oriented to person, place, and time. No cranial nerve deficit. Coordination normal.   Skin: Skin is warm and dry. No rash noted. Psychiatric: Her mood appears anxious. She is agitated. She expresses impulsivity. Nursing note and vitals reviewed. [facility]  ED Course       Procedures    [facility] in to see; spoke with Dr Taryn Bowman; pt to be discharged with outpt followup. NITHIN Chacon    12:25 AM  Patient's results have been reviewed with them.   Patient and/or family have verbally conveyed their understanding and agreement of the patient's signs, symptoms, diagnosis, treatment and prognosis and additionally agree to follow up as recommended or return to the Emergency Room should their condition change prior to follow-up. Discharge instructions have also been provided to the patient with some educational information regarding their diagnosis as well a list of reasons why they would want to return to the ER prior to their follow-up appointment should their condition change.   NITHIN Serrano

## 2017-10-13 NOTE — BSMART NOTE
Patient 33year old  female, Patient arrives to ED with c/o \"I need a detox program\", patient states she needs to detox off of \"everthing, .. Crack, cocaine, alcohol, and heroin\". Patient states last used crack and alcohol before coming to ED, last use heroin this morning. At bedside, patient stated \"I guess I been on a binge of everything over the past five days\". Patient reported mostly using cocaine, heroin and alcohol. Patient denied being suicidial and homicidal; denied hallucinations. Patient then leaned over and stated she was having trouble breathing,  informed nurse. Patient reported needing help with drug use. Patient reported she was not working and reported having several stressors but would not elaborate. Patient reported being on Abilify by Dr. Inder Amaya. Patient does not meet criteria for inpatient hospitalization.  consulted with Dr. Cecilia Arellano and patient given resources for substance abuse.

## 2017-10-13 NOTE — ED TRIAGE NOTES
Patient arrives to ED with c/o \"I need a detox program\", patient states she needs to detox off of \"everthing, .. Crack, cocaine, alcohol, and heroin\". Patient states last used crack and alcohol before coming to ED, last use heroin this morning.

## 2017-10-13 NOTE — DISCHARGE INSTRUCTIONS
Learning About Drug Misuse  What is drug misuse? Drug misuse means using drugs in a way that harms you or causes you to harm others. Your doctor may call it substance use disorder or drug abuse. It's possible to misuse almost any type of drug, including illegal drugs, prescription drugs, and over-the-counter drugs. An overdose happens when a person takes more than the normal or recommended amount of a drug. An overdose can result in harmful symptoms or death. Could you have a problem? If there's a chance you may be misusing drugs, it's important to find out. So ask yourself a few questions. · Do you spend a lot of time thinking about your medicine or other drug--getting it and using it? Has that taken the place of other things you used to enjoy? · Have you had problems with your family or friends, or at work, because of your use? · Do you use drugs even when you've told yourself you won't? Do you think you might have a problem? If you do, then you've just taken an important first step. Many people have overcome this problem. And most of them started by reaching out to others, like caring friends or family, their doctor, or a support group. How is drug misuse treated? If you think you may have a problem with drugs, talk to your doctor. You and your doctor can decide whether you have a problem and what type of treatment might help you. You may need to stay in a hospital at first, so that you can be treated for withdrawal symptoms. One of the goals of treatment is helping you get used to life without the drug. Counseling can help you prepare for people or situations that might tempt you to start using again. You can practice these skills through one-on-one counseling, family therapy, or group therapy. Therapy may be part of inpatient treatment, where you stay in a treatment center.  Or it may be part of outpatient treatment, where you can fit your therapy around your job or other responsibilities. Another goal of treatment is getting ongoing support for your drug-free life. Many people find support by going to meetings like Narcotics Anonymous or [employer]. This type of support can help you feel less alone and more motivated to stay drug-free. You might talk to your doctor or do an online search for local treatment programs. Or you might tell a friend or loved one that you need help. Follow-up care is a key part of your treatment and safety. Be sure to make and go to all appointments, and call your doctor if you are having problems. It's also a good idea to know your test results and keep a list of the medicines you take. Where can you learn more? Go to http://[employer].info/. Enter 456-877-7670 in the search box to learn more about \"Learning About Drug Misuse. \"  Current as of: March 23, 2017  Content Version: 11.3  © 2676-2103 [employer] Care instructions adapted under license by [employer] (which disclaims liability or warranty for this information). If you have questions about a medical condition or this instruction, always ask your healthcare professional. [place] [address] any warranty or liability for your use of this information. We hope that we have addressed all of your medical concerns. The examination and treatment you received in the Emergency Department were for an emergent problem and were not intended as complete care. It is important that you follow up with your healthcare provider(s) for ongoing care. If your symptoms worsen or do not improve as expected, and you are unable to reach your usual health care provider(s), you should return to the Emergency Department. Today's healthcare is undergoing tremendous change, and patient satisfaction surveys are one of the many tools to assess the quality of medical care.   You may receive a survey from the [employer] regarding your experience in the Emergency Department. I hope that your experience has been completely positive, particularly the medical care that I provided. As such, please participate in the survey; anything less than excellent does not meet my expectations or intentions. [facility] and [facility] participate in nationally recognized quality of care measures. If your blood pressure is greater than 120/80, as reported below, we urge that you seek medical care to address the potential of high blood pressure, commonly known as hypertension. Hypertension can be hereditary or can be caused by certain medical conditions, pain, stress, or \"white coat syndrome. \"       Please make an appointment with your health care provider(s) for follow up of your Emergency Department visit. VITALS:   Patient Vitals for the past 8 hrs:   Temp Pulse Resp BP SpO2   10/12/17 2307 99.6 °F (37.6 °C) (!) 104 16 132/73 96 %          Thank you for allowing us to provide you with medical care today. We realize that you have many choices for your emergency care needs. Please choose us in the future for any continued health care needs. [facility], [address] [place] [address] 20.   Office: [phone]

## 2018-07-20 ENCOUNTER — HOSPITAL ENCOUNTER (EMERGENCY)
Age: 28
Discharge: HOME | End: 2018-07-20
Attending: EMERGENCY MEDICINE
Payer: COMMERCIAL

## 2018-07-20 ENCOUNTER — APPOINTMENT (OUTPATIENT)
Dept: ULTRASOUND IMAGING | Age: 28
End: 2018-07-20
Attending: EMERGENCY MEDICINE
Payer: COMMERCIAL

## 2018-07-20 VITALS
OXYGEN SATURATION: 99 % | HEIGHT: 64 IN | WEIGHT: 119.49 LBS | TEMPERATURE: 99 F | HEART RATE: 85 BPM | DIASTOLIC BLOOD PRESSURE: 86 MMHG | BODY MASS INDEX: 20.4 KG/M2 | RESPIRATION RATE: 16 BRPM | SYSTOLIC BLOOD PRESSURE: 108 MMHG

## 2018-07-20 DIAGNOSIS — R10.9 ACUTE ABDOMINAL PAIN: ICD-10-CM

## 2018-07-20 DIAGNOSIS — Z3A.08 8 WEEKS GESTATION OF PREGNANCY: Primary | ICD-10-CM

## 2018-07-20 DIAGNOSIS — O41.8X10 SUBCHORIONIC HEMORRHAGE OF PLACENTA IN FIRST TRIMESTER, SINGLE OR UNSPECIFIED FETUS: ICD-10-CM

## 2018-07-20 DIAGNOSIS — N83.202 LEFT OVARIAN CYST: ICD-10-CM

## 2018-07-20 DIAGNOSIS — O46.8X1 SUBCHORIONIC HEMORRHAGE OF PLACENTA IN FIRST TRIMESTER, SINGLE OR UNSPECIFIED FETUS: ICD-10-CM

## 2018-07-20 LAB
ANION GAP SERPL CALC-SCNC: 10 MMOL/L (ref 5–15)
APPEARANCE UR: ABNORMAL
BACTERIA URNS QL MICRO: ABNORMAL /HPF
BILIRUB UR QL: NEGATIVE
BUN SERPL-MCNC: 20 MG/DL (ref 6–20)
BUN/CREAT SERPL: 27 (ref 12–20)
CALCIUM SERPL-MCNC: 8.8 MG/DL (ref 8.5–10.1)
CHLORIDE SERPL-SCNC: 103 MMOL/L (ref 97–108)
CO2 SERPL-SCNC: 23 MMOL/L (ref 21–32)
COLOR UR: ABNORMAL
CREAT SERPL-MCNC: 0.73 MG/DL (ref 0.55–1.02)
EPITH CASTS URNS QL MICRO: ABNORMAL /LPF
ERYTHROCYTE [DISTWIDTH] IN BLOOD BY AUTOMATED COUNT: 12.6 % (ref 11.5–14.5)
GLUCOSE SERPL-MCNC: 83 MG/DL (ref 65–100)
GLUCOSE UR STRIP.AUTO-MCNC: NEGATIVE MG/DL
HCG SERPL-ACNC: ABNORMAL MIU/ML (ref 0–6)
HCT VFR BLD AUTO: 33.7 % (ref 35–47)
HGB BLD-MCNC: 11.6 G/DL (ref 11.5–16)
HGB UR QL STRIP: NEGATIVE
KETONES UR QL STRIP.AUTO: ABNORMAL MG/DL
LEUKOCYTE ESTERASE UR QL STRIP.AUTO: NEGATIVE
MCH RBC QN AUTO: 31.6 PG (ref 26–34)
MCHC RBC AUTO-ENTMCNC: 34.4 G/DL (ref 30–36.5)
MCV RBC AUTO: 91.8 FL (ref 80–99)
NITRITE UR QL STRIP.AUTO: NEGATIVE
NRBC # BLD: 0 K/UL (ref 0–0.01)
NRBC BLD-RTO: 0 PER 100 WBC
PH UR STRIP: 6 [PH] (ref 5–8)
PLATELET # BLD AUTO: 213 K/UL (ref 150–400)
PMV BLD AUTO: 9.2 FL (ref 8.9–12.9)
POTASSIUM SERPL-SCNC: 3.4 MMOL/L (ref 3.5–5.1)
PROT UR STRIP-MCNC: 100 MG/DL
RBC # BLD AUTO: 3.67 M/UL (ref 3.8–5.2)
RBC #/AREA URNS HPF: ABNORMAL /HPF (ref 0–5)
SODIUM SERPL-SCNC: 136 MMOL/L (ref 136–145)
SP GR UR REFRACTOMETRY: >1.03 (ref 1–1.03)
UA: UC IF INDICATED,UAUC: ABNORMAL
UROBILINOGEN UR QL STRIP.AUTO: 1 EU/DL (ref 0.2–1)
WBC # BLD AUTO: 7.2 K/UL (ref 3.6–11)
WBC URNS QL MICRO: ABNORMAL /HPF (ref 0–4)

## 2018-07-20 PROCEDURE — 85027 COMPLETE CBC AUTOMATED: CPT | Performed by: EMERGENCY MEDICINE

## 2018-07-20 PROCEDURE — 76801 OB US < 14 WKS SINGLE FETUS: CPT

## 2018-07-20 PROCEDURE — 80048 BASIC METABOLIC PNL TOTAL CA: CPT | Performed by: EMERGENCY MEDICINE

## 2018-07-20 PROCEDURE — 87086 URINE CULTURE/COLONY COUNT: CPT | Performed by: EMERGENCY MEDICINE

## 2018-07-20 PROCEDURE — 84702 CHORIONIC GONADOTROPIN TEST: CPT | Performed by: EMERGENCY MEDICINE

## 2018-07-20 PROCEDURE — 99283 EMERGENCY DEPT VISIT LOW MDM: CPT

## 2018-07-20 PROCEDURE — 81001 URINALYSIS AUTO W/SCOPE: CPT | Performed by: EMERGENCY MEDICINE

## 2018-07-20 PROCEDURE — 36415 COLL VENOUS BLD VENIPUNCTURE: CPT | Performed by: EMERGENCY MEDICINE

## 2018-07-20 RX ORDER — ACETAMINOPHEN 325 MG/1
650 TABLET ORAL
Status: DISCONTINUED | OUTPATIENT
Start: 2018-07-20 | End: 2018-07-20

## 2018-07-20 NOTE — DISCHARGE INSTRUCTIONS
Thank you! Thank you for allowing us to provide you with excellent care today. We hope we addressed all of your concerns and needs. We strive to provide excellent quality care in the Emergency Department. You may receive a survey after your visit to evaluate the care you were provided. Should you receive a survey from us, we invite you to share your experience and tell us what made it excellent. It was a pleasure serving you, we invite you to share your experience with us, in our pursuit for excellence, should you be selected to receive a survey. If you feel that you have not received excellent quality care or timely care, please ask to speak to the nurse manager. Please choose us in the future for your continued health care needs. ------------------------------------------------------------------------------------------------------------  The exam and treatment you received in the Emergency Department were for an urgent problem and are not intended as complete care. It is important that you follow up with a doctor, nurse practitioner, or physician assistant for ongoing care. If your symptoms become worse or you do not improve as expected and you are unable to reach your usual health care provider, you should return to the Emergency Department. We are available 24 hours a day. Please take your discharge instructions with you when you go to your follow-up appointment. If you have any problem arranging a follow-up appointment, contact the Emergency Department immediately. If a prescription has been provided, please have it filled as soon as possible to prevent a delay in treatment. Read the entire medication instruction sheet provided to you by the pharmacy. If you have any questions or reservations about taking the medication due to side effects or interactions with other medications, please call your primary care physician or contact the ER to speak with the charge nurse.      Make an appointment with your family doctor or the physician you were referred to for follow-up of this visit as instructed on your discharge paperwork, as this is mandatory follow-up. Return to the ER if you are unable to be seen or if you are unable to be seen in a timely manner. If you have any problem arranging the follow-up visit, contact the Emergency Department immediately. Belly Pain in Pregnancy: Care Instructions  Your Care Instructions    When you're pregnant, any belly pain can be a worry. You may not want to call your doctor about every pain you have. But you don't want to miss something that is dangerous for you or your baby. Even if it feels familiar, belly pain can mean something new when you're pregnant. It's important to know when to call your doctor. It will also help to know how to care for yourself at home when your pain is not caused by anything harmful. · When belly pain is more severe or constant, see a doctor right away. · If you're sure your belly pain is a sign of labor, call your doctor. · When belly pain is brief, it's usually a normal part of pregnancy. It might be related to changes in the growing uterus. Or it could be the stretching of ligaments called round ligaments. These ligaments help support the uterus. Round ligament pain can be on either side of your belly. It can also be felt in your hips or groin. Follow-up care is a key part of your treatment and safety. Be sure to make and go to all appointments, and call your doctor if you are having problems. It's also a good idea to know your test results and keep a list of the medicines you take. How can you tell if belly pain is a sign of labor? When belly pain is caused by labor, it can feel like mild or menstrual-like cramps in your lower belly. These cramps are probably contractions. They can happen in your second or third trimester.   You may also have:  · A steady, dull ache in your lower back, pelvis, or thighs. · A feeling of pressure in your pelvis or lower belly. · Changes in your vaginal discharge or a sudden release of fluid from the vagina. If you think you are in labor, call your doctor. How can you care for yourself at home? When belly pain is mild and is not a symptom of labor:  · Rest until you feel better. · Take a warm bath. · Think about what you drink and eat:  ¨ Drink plenty of fluids. Choose water and other caffeine-free clear liquids until you feel better. ¨ Try eating small, frequent meals. If your stomach is upset, try bland, low-fat foods like plain rice, broiled chicken, toast, and yogurt. · Think about how you move if you are having brief pains from stretching of the round ligaments. ¨ Try gentle stretching. ¨ Move a little more slowly when turning in bed or getting up from a chair, so those ligaments don't stretch quickly. ¨ Lean forward a bit if you think you are going to cough or sneeze. When should you call for help? Call 911 anytime you think you may need emergency care. For example, call if:    · You have sudden, severe pain in your belly.     · You have severe vaginal bleeding.    Call your doctor now or seek immediate medical care if:    · You have new or worse belly pain or cramping.     · You have any vaginal bleeding.     · You have a fever.     · You have symptoms of preeclampsia, such as:  ¨ Sudden swelling of your face, hands, or feet. ¨ New vision problems (such as dimness or blurring). ¨ A severe headache.     · You think that you may be in labor. This means that you've had at least 8 contractions within 1 hour or at least 4 contractions within 20 minutes, even after you change your position and drink fluids.     · You have symptoms of a urinary tract infection. These may include:  ¨ Pain or burning when you urinate. ¨ A frequent need to urinate without being able to pass much urine.   ¨ Pain in the flank, which is just below the rib cage and above the waist on either side of the back. ¨ Blood in your urine.    Watch closely for changes in your health, and be sure to contact your doctor if you are worried about your or your baby's health. Where can you learn more? Go to http://bulmaro-katerin.info/. Enter 755 428 [id number] in the search box to learn more about \"Belly Pain in Pregnancy: Care Instructions. \"  Current as of: November 21, 2017  Content Version: 11.7  © 8433-3219 [employer]. Care instructions adapted under license by [employer] (which disclaims liability or warranty for this information). If you have questions about a medical condition or this instruction, always ask your healthcare professional. [place] [address] any warranty or liability for your use of this information. Learning About When to Call Your Doctor During Pregnancy (Up to 20 Weeks)  Your Care Instructions    It's common to have concerns about what might be a problem during pregnancy. Although most pregnant women don't have any serious problems, it's important to know when to call your doctor if you have certain symptoms. These are general suggestions. Your doctor may give you some more information about when to call. When to call your doctor (up to 20 weeks)  Call 911 anytime you think you may need emergency care. For example, call if:  · You passed out (lost consciousness). Call your doctor now or seek immediate medical care if:  · You have a fever. · You have vaginal bleeding. · You are dizzy or lightheaded, or you feel like you may faint. · You have symptoms of a urinary tract infection. These may include:  ¨ Pain or burning when you urinate. ¨ A frequent need to urinate without being able to pass much urine. ¨ Pain in the flank, which is just below the rib cage and above the waist on either side of the back. ¨ Blood in your urine. · You have belly pain. · You think you are having contractions.   · You have a sudden release of fluid from your vagina. Watch closely for changes in your health, and be sure to contact your doctor if:  · You have vaginal discharge that smells bad. · You have other concerns about your pregnancy. Follow-up care is a key part of your treatment and safety. Be sure to make and go to all appointments, and call your doctor if you are having problems. It's also a good idea to know your test results and keep a list of the medicines you take. Where can you learn more? Go to http://bulmaro-katerin.info/. Enter N752 in the search box to learn more about \"Learning About When to Call Your Doctor During Pregnancy (Up to 20 Weeks). \"  Current as of: November 21, 2017  Content Version: 11.7  © 0659-5410 [employer]. Care instructions adapted under license by [employer] (which disclaims liability or warranty for this information). If you have questions about a medical condition or this instruction, always ask your healthcare professional. [address] any warranty or liability for your use of this information.

## 2018-07-20 NOTE — LETTER
[address] 
[facility] EMERGENCY DEPT 
[address] [facility] [id number] [phone] Work/School Note Date: 7/20/2018 To Whom It May concern: Rama Matthews was seen and treated today in the emergency room by the following provider(s): 
Attending Provider: Radha Rosa MD.   
 
Rama Matthews may return to work on light duty until 7/27/2018. Sincerely, Radha Rosa MD

## 2018-07-20 NOTE — ED NOTES
Pt discharged with written instructions at this time. Pt verbalizes understanding and all questions were answered. Discharged by , in stable condition, ambulatory.

## 2018-07-20 NOTE — ED PROVIDER NOTES
EMERGENCY DEPARTMENT HISTORY AND PHYSICAL EXAM 
 
 
Date: 7/20/2018 Patient Name: Andrei Huggins History of Presenting Illness Chief Complaint Patient presents with  Abdominal Pain Pt ambulatory to triage, states she is about 8 weeks pregnant, started having lower abdominal cramping; pt states she has miscarried before and is worried; pt denies vaginal bleeding, discharge, loss of fluid; pt denies N/V/D History Provided By: Patient HPI: Andrei Huggins, 33 y.o. female currently about 8 weeks pregnant ([place] Box) with PMHx significant for asthma / heroin use in early pregnancy, presents ambulatory to the ED with cc of sudden onset of suprapubic cramping abdominal pain that began last night and has improved since onset but not resolved entirely. Pt denies any associated symptoms. She denies any exacerbating or alleviating factors. 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 She denies any known complication with her current pregnancy. Pt endorses maintaining good oral hydration. She notes that she is in the process of switching her OB but has not yet made an appointment. Pt states that she has not started taking prenatal vitamins as she has not been able to go pick them up. She denies taking any other daily medications. Pt specifically denies dysuria, hematuria, vaginal discharge, vaginal bleeding, nausea, vomiting, constipation, or diarrhea. There are no other complaints, changes, or physical findings at this time. PCP: None Past History Past Medical History: 
Past Medical History:  
Diagnosis Date  Abnormal Papanicolaou smear of cervix   
 no tx required  Asthma   
 last attack 5 months ago  Other unknown and unspecified cause of morbidity or mortality Heroin use in early pregnancy Past Surgical History: 
Past Surgical History:  
Procedure Laterality Date  HX OTHER SURGICAL    
 wisdom teeth  HX OTHER SURGICAL    
 d&c 2011 Family History: 
History reviewed. No pertinent family history. Social History: 
Social History Substance Use Topics  Smoking status: Current Every Day Smoker Packs/day: 0.50 Years: 2.00 Last attempt to quit: 3/9/2015  Smokeless tobacco: Never Used  Alcohol use No  
 
 
Allergies: Allergies Allergen Reactions  Sulfa (Sulfonamide Antibiotics) Rash Review of Systems Review of Systems Constitutional: Negative. Negative for chills and fever. HENT: Negative. Negative for congestion, facial swelling, rhinorrhea, sore throat, trouble swallowing and voice change. Eyes: Negative. Respiratory: Negative. Negative for apnea, cough, chest tightness, shortness of breath and wheezing. Cardiovascular: Negative. Negative for chest pain, palpitations and leg swelling. Gastrointestinal: Positive for abdominal pain. Negative for abdominal distention, blood in stool, constipation, diarrhea, nausea and vomiting. Endocrine: Negative. Negative for cold intolerance, heat intolerance and polyuria. Genitourinary: Negative. Negative for difficulty urinating, dysuria, flank pain, frequency, hematuria and urgency. Musculoskeletal: Negative. Negative for arthralgias, back pain, myalgias, neck pain and neck stiffness. Skin: Negative. Negative for color change and rash. Neurological: Negative. Negative for dizziness, syncope, facial asymmetry, speech difficulty, weakness, light-headedness, numbness and headaches. Hematological: Negative. Does not bruise/bleed easily. Psychiatric/Behavioral: Negative. Negative for confusion and self-injury. The patient is not nervous/anxious. Physical Exam  
Physical Exam  
Constitutional: She is oriented to person, place, and time. She appears well-developed and well-nourished. No distress. HENT:  
Head: Normocephalic and atraumatic.   
Mouth/Throat: Oropharynx is clear and moist. No oropharyngeal exudate. Eyes: Conjunctivae and EOM are normal. Pupils are equal, round, and reactive to light. Neck: Normal range of motion. Cardiovascular: Normal rate, regular rhythm and normal heart sounds. Exam reveals no gallop and no friction rub. No murmur heard. Pulmonary/Chest: Effort normal and breath sounds normal. No respiratory distress. She has no wheezes. She has no rales. She exhibits no tenderness. Abdominal: Soft. Bowel sounds are normal. She exhibits no distension and no mass. There is no rebound and no guarding. Mild suprapubic fullness Musculoskeletal: Normal range of motion. She exhibits no edema, tenderness or deformity. Neurological: She is alert and oriented to person, place, and time. She displays normal reflexes. No cranial nerve deficit. She exhibits normal muscle tone. Coordination normal.  
Skin: Skin is warm. No rash noted. She is not diaphoretic. Psychiatric: She has a normal mood and affect. Nursing note and vitals reviewed. Diagnostic Study Results Labs - Recent Results (from the past 12 hour(s)) CBC W/O DIFF Collection Time: 07/20/18  1:23 AM  
Result Value Ref Range WBC 7.2 3.6 - 11.0 K/uL  
 RBC 3.67 (L) 3.80 - 5.20 M/uL  
 HGB 11.6 11.5 - 16.0 g/dL HCT 33.7 (L) 35.0 - 47.0 % MCV 91.8 80.0 - 99.0 FL  
 MCH 31.6 26.0 - 34.0 PG  
 MCHC 34.4 30.0 - 36.5 g/dL  
 RDW 12.6 11.5 - 14.5 % PLATELET 013 811 - 356 K/uL MPV 9.2 8.9 - 12.9 FL  
 NRBC 0.0 0  WBC ABSOLUTE NRBC 0.00 0.00 - 0.01 K/uL METABOLIC PANEL, BASIC Collection Time: 07/20/18  1:23 AM  
Result Value Ref Range Sodium 136 136 - 145 mmol/L Potassium 3.4 (L) 3.5 - 5.1 mmol/L Chloride 103 97 - 108 mmol/L  
 CO2 23 21 - 32 mmol/L Anion gap 10 5 - 15 mmol/L Glucose 83 65 - 100 mg/dL BUN 20 6 - 20 MG/DL Creatinine 0.73 0.55 - 1.02 MG/DL  
 BUN/Creatinine ratio 27 (H) 12 - 20 GFR est AA >60 >60 ml/min/1.73m2 GFR est non-AA >60 >60 ml/min/1.73m2 Calcium 8.8 8.5 - 10.1 MG/DL  
TOTAL HCG, QT. Collection Time: 07/20/18  1:23 AM  
Result [place] [address] (H) 0 - 6 MIU/ML  
URINALYSIS W/ REFLEX CULTURE Collection Time: 07/20/18  2:45 AM  
Result Value Ref Range Color YELLOW/STRAW Appearance CLOUDY (A) CLEAR Specific gravity >1.030 (H) 1.003 - 1.030  
 pH (UA) 6.0 5.0 - 8.0 Protein 100 (A) NEG mg/dL Glucose NEGATIVE  NEG mg/dL Ketone TRACE (A) NEG mg/dL Bilirubin NEGATIVE  NEG Blood NEGATIVE  NEG Urobilinogen 1.0 0.2 - 1.0 EU/dL Nitrites NEGATIVE  NEG Leukocyte Esterase NEGATIVE  NEG    
 WBC 0-4 0 - 4 /hpf  
 RBC 0-5 0 - 5 /hpf Epithelial cells FEW FEW /lpf Bacteria 2+ (A) NEG /hpf  
 UA:UC IF INDICATED URINE CULTURE ORDERED (A) CNI Radiologic Studies -  
US PREG UTS < 14 WKS SNGL Final Result Study Result   
  EXAM:  US PREG UTS < 14 WKS SNGL 
  
INDICATION:   vaginal bleeding, 8 weeks pregnant 
  
COMPARISON: None. 
  
TECHNIQUE: Realtime sonography of the pelvis was performed transabdominally with 
multiple static images obtained. Patient declined transvaginal evaluation. 
  
FINDINGS: 
The examination demonstrates a single intrauterine pregnancy with a gestational 
sac size of 3.06 cm corresponding to estimated gestational age of 11 weeks, 0 
days. Fetal cardiac activity is identified with a fetal heart rate of 179 beats 
per minute. A yolk sac is visualized. The placenta is seen at this early stage. There is subjectively adequate amniotic fluid. There is a crescentic low-density 
collection at the margin of the gestation measuring 0.6 x 1.5 x 2.9 cm. The 
maternal ovaries show a cyst in left ovary with internal echoes measuring 1.2 x 
1.3 x 1.4 cm. The ovaries are otherwise unremarkable with normal color-flow 
measuring 1.3 x 2.5 x 2.6 cm on the right and 2.0 x 2.3 x 3.7 cm on the left. . 
  
IMPRESSION IMPRESSION: Single live 8 week, 0 day intrauterine pregnancy with small 
subchorionic hemorrhage. Complex left 1.4 cm ovarian cyst noted. Medical Decision Making I am the first provider for this patient. I reviewed the vital signs, available nursing notes, past medical history, past surgical history, family history and social history. Vital Signs-Reviewed the patient's vital signs. Patient Vitals for the past 12 hrs: 
 Temp Pulse Resp BP SpO2  
/ 0033 99 °F (37.2 °C) 85 16 108/86 99 % Pulse Oximetry Analysis - 99% on RA Records Reviewed: Nursing Notes, Old Medical Records, Previous electrocardiograms, Previous Radiology Studies and Previous Laboratory Studies Provider Notes (Medical Decision Making): DDx threatened , UTI, electrolyte disturbance Tuyet Downey female with history of miscarriages presenting with abdominal pain. Will check labs, US, and UA, and reassess. ED Course:  
Initial assessment performed. The patients presenting problems have been discussed, and they are in agreement with the care plan formulated and outlined with them. I have encouraged them to ask questions as they arise throughout their visit. Medications - No data to display TOBACCO COUNSELING: 
Upon evaluation, pt expressed that they are a current tobacco user. For approximately 10 minutes, pt has been counseled on the dangers of smoking and was encouraged to quit as soon as possible in order to decrease further risks to their health. Pt has conveyed their understanding of the risks involved should they continue to use tobacco products. Progress Note: 
2:19 AM 
US shows small subchorionic hemorrhage, will discuss with OB hospitalist. 
Written by FRANK Read, as dictated by King Johan MD. 
 
CONSULT NOTE:  
2:22 Alireza Samuels MD spoke with Rehana Hough MD, Specialty: OB Hospitalist 
Discussed pt's hx, disposition, and available diagnostic and imaging results. Reviewed care plans. Consultant agrees with plans as outlined. Consult states that she feels comfortable with discharging the pt and having her follow up in the office. Written by FRANK Sanderse, as dictated by Kamala Gonzalez MD. 
 
Progress Note: 
Pt states she feels much better; pain resolved; denies any nausea; no new symptoms; pt able to tolerate PO and ambulate without issues; pt clinically safe for discharge home with close PCP f/u. At time of discharge, pt had stable vitals and had no questions or concerns, and was very satisfied with overall care. Critical Care Time:  
0 Disposition: 
DISCHARGE NOTE 
2:40 AM 
The patient has been re-evaluated and is ready for discharge. Reviewed available results with patient. Counseled pt on diagnosis and care plan. Pt has expressed understanding, and all questions have been answered. Pt agrees with plan and agrees to F/U as recommended, or return to the ED if their sxs worsen. Discharge instructions have been provided and explained to the pt, along with reasons to return to the ED. Written by FRANK Brunner, as dictated by Kamala Gonzalez MD. 
 
PLAN: 
1. There are no discharge medications for this patient. 2.  
Follow-up Information Follow up With Details Comments Contact Info None   None ([phone]) Patient stated that they have no PCP [facility] EMERGENCY DEPT  As needed, If symptoms worsen [address] 
[phone] Return to ED if worse Diagnosis Clinical Impression: 1. 8 weeks gestation of pregnancy 2. Acute abdominal pain 3. Left ovarian cyst   
4. Subchorionic hemorrhage of placenta in first trimester, single or unspecified fetus Attestations: This note is prepared by Papa López, acting as Scribe for Kamala Gonzalez MD. 
 
The scribe's documentation has been prepared under my direction and personally reviewed by me in its entirety.  I confirm that the note above accurately reflects all work, treatment, procedures, and medical decision making performed by me. Dank Fuentes MD 
 
 
 
This note will not be viewable in [address]

## 2018-07-21 LAB
BACTERIA SPEC CULT: NORMAL
CC UR VC: NORMAL
SERVICE CMNT-IMP: NORMAL

## 2018-08-06 LAB
ANTIBODY SCREEN, EXTERNAL: NEGATIVE
CHLAMYDIA, EXTERNAL: NEGATIVE
HBSAG, EXTERNAL: NEGATIVE
HIV, EXTERNAL: NON REACTIVE
N. GONORRHEA, EXTERNAL: NEGATIVE
RUBELLA, EXTERNAL: NORMAL
T. PALLIDUM, EXTERNAL: NEGATIVE
TYPE, ABO & RH, EXTERNAL: NORMAL

## 2019-01-25 LAB — GRBS, EXTERNAL: NEGATIVE

## 2019-02-08 ENCOUNTER — ANESTHESIA (OUTPATIENT)
Dept: LABOR AND DELIVERY | Age: 29
DRG: 560 | End: 2019-02-08
Payer: COMMERCIAL

## 2019-02-08 ENCOUNTER — HOSPITAL ENCOUNTER (INPATIENT)
Age: 29
LOS: 2 days | Discharge: HOME | DRG: 560 | End: 2019-02-10
Attending: OBSTETRICS & GYNECOLOGY | Admitting: OBSTETRICS & GYNECOLOGY
Payer: COMMERCIAL

## 2019-02-08 ENCOUNTER — ANESTHESIA EVENT (OUTPATIENT)
Dept: LABOR AND DELIVERY | Age: 29
DRG: 560 | End: 2019-02-08
Payer: COMMERCIAL

## 2019-02-08 DIAGNOSIS — Z34.90 PREGNANCY, UNSPECIFIED GESTATIONAL AGE: Primary | ICD-10-CM

## 2019-02-08 LAB
BASOPHILS # BLD: 0 K/UL (ref 0–0.1)
BASOPHILS NFR BLD: 0 % (ref 0–1)
DIFFERENTIAL METHOD BLD: ABNORMAL
EOSINOPHIL # BLD: 0 K/UL (ref 0–0.4)
EOSINOPHIL NFR BLD: 0 % (ref 0–7)
ERYTHROCYTE [DISTWIDTH] IN BLOOD BY AUTOMATED COUNT: 12.8 % (ref 11.5–14.5)
HCT VFR BLD AUTO: 30.6 % (ref 35–47)
HGB BLD-MCNC: 10.1 G/DL (ref 11.5–16)
IMM GRANULOCYTES # BLD AUTO: 0 K/UL (ref 0–0.04)
IMM GRANULOCYTES NFR BLD AUTO: 0 % (ref 0–0.5)
LYMPHOCYTES # BLD: 2 K/UL (ref 0.8–3.5)
LYMPHOCYTES NFR BLD: 35 % (ref 12–49)
MCH RBC QN AUTO: 28.9 PG (ref 26–34)
MCHC RBC AUTO-ENTMCNC: 33 G/DL (ref 30–36.5)
MCV RBC AUTO: 87.7 FL (ref 80–99)
MONOCYTES # BLD: 0.6 K/UL (ref 0–1)
MONOCYTES NFR BLD: 10 % (ref 5–13)
NEUTS SEG # BLD: 3.1 K/UL (ref 1.8–8)
NEUTS SEG NFR BLD: 55 % (ref 32–75)
NRBC # BLD: 0 K/UL (ref 0–0.01)
NRBC BLD-RTO: 0 PER 100 WBC
PLATELET # BLD AUTO: 204 K/UL (ref 150–400)
PMV BLD AUTO: 10 FL (ref 8.9–12.9)
RBC # BLD AUTO: 3.49 M/UL (ref 3.8–5.2)
WBC # BLD AUTO: 5.7 K/UL (ref 3.6–11)

## 2019-02-08 PROCEDURE — 74011250637 HC RX REV CODE- 250/637: Performed by: OBSTETRICS & GYNECOLOGY

## 2019-02-08 PROCEDURE — 74011250636 HC RX REV CODE- 250/636

## 2019-02-08 PROCEDURE — 10907ZC DRAINAGE OF AMNIOTIC FLUID, THERAPEUTIC FROM PRODUCTS OF CONCEPTION, VIA NATURAL OR ARTIFICIAL OPENING: ICD-10-PCS | Performed by: OBSTETRICS & GYNECOLOGY

## 2019-02-08 PROCEDURE — 75410000003 HC RECOV DEL/VAG/CSECN EA 0.5 HR

## 2019-02-08 PROCEDURE — 85025 COMPLETE CBC W/AUTO DIFF WBC: CPT

## 2019-02-08 PROCEDURE — A4300 CATH IMPL VASC ACCESS PORTAL: HCPCS

## 2019-02-08 PROCEDURE — 36415 COLL VENOUS BLD VENIPUNCTURE: CPT

## 2019-02-08 PROCEDURE — 75410000000 HC DELIVERY VAGINAL/SINGLE

## 2019-02-08 PROCEDURE — 77030018749 HC HK AMNIO DISP DERY -A

## 2019-02-08 PROCEDURE — 76060000078 HC EPIDURAL ANESTHESIA

## 2019-02-08 PROCEDURE — 77030014125 HC TY EPDRL BBMI -B: Performed by: ANESTHESIOLOGY

## 2019-02-08 PROCEDURE — 74011000250 HC RX REV CODE- 250

## 2019-02-08 PROCEDURE — 77030021125

## 2019-02-08 PROCEDURE — 88307 TISSUE EXAM BY PATHOLOGIST: CPT

## 2019-02-08 PROCEDURE — 75410000002 HC LABOR FEE PER 1 HR

## 2019-02-08 PROCEDURE — 74011250636 HC RX REV CODE- 250/636: Performed by: OBSTETRICS & GYNECOLOGY

## 2019-02-08 PROCEDURE — 77030003666 HC NDL SPINAL BD -A

## 2019-02-08 PROCEDURE — 3E033VJ INTRODUCTION OF OTHER HORMONE INTO PERIPHERAL VEIN, PERCUTANEOUS APPROACH: ICD-10-PCS | Performed by: OBSTETRICS & GYNECOLOGY

## 2019-02-08 PROCEDURE — 65410000002 HC RM PRIVATE OB

## 2019-02-08 PROCEDURE — 77030003666 HC NDL SPINAL BD -A: Performed by: ANESTHESIOLOGY

## 2019-02-08 RX ORDER — HYDROCODONE BITARTRATE AND ACETAMINOPHEN 5; 325 MG/1; MG/1
1 TABLET ORAL
Status: DISCONTINUED | OUTPATIENT
Start: 2019-02-08 | End: 2019-02-09

## 2019-02-08 RX ORDER — IBUPROFEN 400 MG/1
800 TABLET ORAL EVERY 8 HOURS
Status: DISCONTINUED | OUTPATIENT
Start: 2019-02-08 | End: 2019-02-10 | Stop reason: HOSPADM

## 2019-02-08 RX ORDER — NALOXONE HYDROCHLORIDE 0.4 MG/ML
0.4 INJECTION, SOLUTION INTRAMUSCULAR; INTRAVENOUS; SUBCUTANEOUS AS NEEDED
Status: DISCONTINUED | OUTPATIENT
Start: 2019-02-08 | End: 2019-02-08

## 2019-02-08 RX ORDER — OXYTOCIN/RINGER'S LACTATE 20/1000 ML
125-500 PLASTIC BAG, INJECTION (ML) INTRAVENOUS ONCE
Status: ACTIVE | OUTPATIENT
Start: 2019-02-08 | End: 2019-02-09

## 2019-02-08 RX ORDER — SODIUM CHLORIDE, SODIUM LACTATE, POTASSIUM CHLORIDE, CALCIUM CHLORIDE 600; 310; 30; 20 MG/100ML; MG/100ML; MG/100ML; MG/100ML
125 INJECTION, SOLUTION INTRAVENOUS CONTINUOUS
Status: DISCONTINUED | OUTPATIENT
Start: 2019-02-08 | End: 2019-02-10 | Stop reason: HOSPADM

## 2019-02-08 RX ORDER — DOCUSATE SODIUM 100 MG/1
100 CAPSULE, LIQUID FILLED ORAL
Status: DISCONTINUED | OUTPATIENT
Start: 2019-02-08 | End: 2019-02-10 | Stop reason: HOSPADM

## 2019-02-08 RX ORDER — BUPIVACAINE HYDROCHLORIDE 2.5 MG/ML
INJECTION, SOLUTION EPIDURAL; INFILTRATION; INTRACAUDAL AS NEEDED
Status: DISCONTINUED | OUTPATIENT
Start: 2019-02-08 | End: 2019-02-08 | Stop reason: HOSPADM

## 2019-02-08 RX ORDER — OXYTOCIN/0.9 % SODIUM CHLORIDE 30/500 ML
0-25 PLASTIC BAG, INJECTION (ML) INTRAVENOUS
Status: DISCONTINUED | OUTPATIENT
Start: 2019-02-08 | End: 2019-02-08

## 2019-02-08 RX ORDER — ZOLPIDEM TARTRATE 5 MG/1
5 TABLET ORAL
Status: DISCONTINUED | OUTPATIENT
Start: 2019-02-08 | End: 2019-02-10 | Stop reason: HOSPADM

## 2019-02-08 RX ORDER — FENTANYL/BUPIVACAINE/NS/PF 2-1250MCG
1-18 PREFILLED PUMP RESERVOIR EPIDURAL CONTINUOUS
Status: DISCONTINUED | OUTPATIENT
Start: 2019-02-08 | End: 2019-02-08

## 2019-02-08 RX ORDER — OXYTOCIN/0.9 % SODIUM CHLORIDE 30/500 ML
PLASTIC BAG, INJECTION (ML) INTRAVENOUS
Status: COMPLETED
Start: 2019-02-08 | End: 2019-02-08

## 2019-02-08 RX ORDER — NALOXONE HYDROCHLORIDE 0.4 MG/ML
0.4 INJECTION, SOLUTION INTRAMUSCULAR; INTRAVENOUS; SUBCUTANEOUS AS NEEDED
Status: DISCONTINUED | OUTPATIENT
Start: 2019-02-08 | End: 2019-02-10 | Stop reason: HOSPADM

## 2019-02-08 RX ORDER — SODIUM CHLORIDE 0.9 % (FLUSH) 0.9 %
5-40 SYRINGE (ML) INJECTION EVERY 8 HOURS
Status: DISCONTINUED | OUTPATIENT
Start: 2019-02-08 | End: 2019-02-10 | Stop reason: HOSPADM

## 2019-02-08 RX ORDER — BUPIVACAINE HYDROCHLORIDE AND EPINEPHRINE 2.5; 5 MG/ML; UG/ML
INJECTION, SOLUTION EPIDURAL; INFILTRATION; INTRACAUDAL; PERINEURAL AS NEEDED
Status: DISCONTINUED | OUTPATIENT
Start: 2019-02-08 | End: 2019-02-08 | Stop reason: HOSPADM

## 2019-02-08 RX ORDER — FENTANYL/BUPIVACAINE/NS/PF 2-1250MCG
PREFILLED PUMP RESERVOIR EPIDURAL
Status: COMPLETED
Start: 2019-02-08 | End: 2019-02-08

## 2019-02-08 RX ORDER — SODIUM CHLORIDE 0.9 % (FLUSH) 0.9 %
5-40 SYRINGE (ML) INJECTION AS NEEDED
Status: DISCONTINUED | OUTPATIENT
Start: 2019-02-08 | End: 2019-02-10 | Stop reason: HOSPADM

## 2019-02-08 RX ORDER — BUPIVACAINE HYDROCHLORIDE AND EPINEPHRINE 2.5; 5 MG/ML; UG/ML
INJECTION, SOLUTION EPIDURAL; INFILTRATION; INTRACAUDAL; PERINEURAL
Status: COMPLETED
Start: 2019-02-08 | End: 2019-02-08

## 2019-02-08 RX ORDER — HYDROCORTISONE ACETATE PRAMOXINE HCL 2.5; 1 G/100G; G/100G
CREAM TOPICAL AS NEEDED
Status: DISCONTINUED | OUTPATIENT
Start: 2019-02-08 | End: 2019-02-10 | Stop reason: HOSPADM

## 2019-02-08 RX ORDER — EPHEDRINE SULFATE 50 MG/ML
10 INJECTION, SOLUTION INTRAVENOUS AS NEEDED
Status: DISCONTINUED | OUTPATIENT
Start: 2019-02-08 | End: 2019-02-08

## 2019-02-08 RX ADMIN — Medication 10 ML: at 08:39

## 2019-02-08 RX ADMIN — Medication 12 ML/HR: at 09:24

## 2019-02-08 RX ADMIN — IBUPROFEN 800 MG: 400 TABLET ORAL at 23:02

## 2019-02-08 RX ADMIN — Medication 2 MILLI-UNITS/MIN: at 08:07

## 2019-02-08 RX ADMIN — BUPIVACAINE HYDROCHLORIDE AND EPINEPHRINE 0.8 ML: 2.5; 5 INJECTION, SOLUTION EPIDURAL; INFILTRATION; INTRACAUDAL; PERINEURAL at 09:17

## 2019-02-08 RX ADMIN — IBUPROFEN 800 MG: 400 TABLET ORAL at 14:58

## 2019-02-08 RX ADMIN — HYDROCODONE BITARTRATE AND ACETAMINOPHEN 1 TABLET: 5; 325 TABLET ORAL at 20:39

## 2019-02-08 RX ADMIN — SODIUM CHLORIDE, SODIUM LACTATE, POTASSIUM CHLORIDE, AND CALCIUM CHLORIDE 125 ML/HR: 600; 310; 30; 20 INJECTION, SOLUTION INTRAVENOUS at 07:00

## 2019-02-08 RX ADMIN — OXYTOCIN-SODIUM CHLORIDE 0.9% IV SOLN 30 UNIT/500ML 2 MILLI-UNITS/MIN: 30-0.9/5 SOLUTION at 08:07

## 2019-02-08 RX ADMIN — BUPIVACAINE HYDROCHLORIDE 5 ML: 2.5 INJECTION, SOLUTION EPIDURAL; INFILTRATION; INTRACAUDAL at 09:20

## 2019-02-08 RX ADMIN — SODIUM CHLORIDE, SODIUM LACTATE, POTASSIUM CHLORIDE, AND CALCIUM CHLORIDE 999 ML/HR: 600; 310; 30; 20 INJECTION, SOLUTION INTRAVENOUS at 08:50

## 2019-02-08 NOTE — ANESTHESIA PROCEDURE NOTES
Epidural Block Start time: 2/8/2019 9:13 AM 
End time: 2/8/2019 9:28 AM 
Performed by: Eleazar Shay MD 
Authorized by: Eleazar Shay MD  
 
Pre-Procedure Indication: labor epidural   
Preanesthetic Checklist: patient identified, risks and benefits discussed, anesthesia consent, site marked, patient being monitored, timeout performed and anesthesia consent Epidural:  
Patient position:  Seated Prep region:  Lumbar Prep: Patient draped and DuraPrep Location:  L2-3 Needle and Epidural Catheter:  
Needle Type:  Tuohy Needle Gauge:  17 G Injection Technique:  Loss of resistance using saline Attempts:  1 Catheter Size:  19 G Catheter at Skin Depth (cm):  10 Depth in Epidural Space (cm):  4 Events: no blood with aspiration, no cerebrospinal fluid with aspiration, no paresthesia and negative aspiration test   
Test Dose:  Bupivacaine 0.25% w/ epi and negative Assessment:  
Catheter Secured:  Tegaderm and tape Insertion:  Uncomplicated Patient tolerance:  Patient tolerated the procedure well with no immediate complications Spinal portion: A 25 g pencil point spinal needle was placed through the Touhy x1 attempt until CSF was obtained. 0.8 mL 0.25% bupivacaine with 1:200K epinephrine was deposited into the CSF. -paresthesia.

## 2019-02-08 NOTE — ANESTHESIA PREPROCEDURE EVALUATION
Anesthetic History No history of anesthetic complications Review of Systems / Medical History Patient summary reviewed, nursing notes reviewed and pertinent labs reviewed Pulmonary Asthma : well controlled Neuro/Psych Within defined limits Cardiovascular Within defined limits Exercise tolerance: >4 METS 
  
GI/Hepatic/Renal 
Within defined limits Endo/Other Within defined limits Other Findings Physical Exam 
 
Airway Mallampati: II 
TM Distance: 4 - 6 cm Neck ROM: normal range of motion Mouth opening: Normal 
 
 Cardiovascular Regular rate and rhythm,  S1 and S2 normal,  no murmur, click, rub, or gallop Dental 
No notable dental hx Pulmonary Breath sounds clear to auscultation Abdominal 
GI exam deferred Other Findings Anesthetic Plan ASA: 2 Anesthesia type: CSE Anesthetic plan and risks discussed with: Patient

## 2019-02-08 NOTE — L&D DELIVERY NOTE
Delivery Summary  Patient: Theodore Max             Circumcision:   NA-female  Additional Delivery Comments - IOL for 5% growth with <1% AC. Had AROM and pitocin. Pt pushed with 1 ctx and delivered viable female over intact perineum. Shoulders easily followed. Baby to maternal abdomen. Delayed cord clamp x approx. 90 sec. Placenta delivered spontaneously and intact. Fundus firm, minimal bleeding. Placenta appeared normal, sent to path due to IUGR. Information for the patient's :  Bella Araiza [[id number]]       Labor Events:    Labor: No   Rupture Date: 2019   Rupture Time: 12:22 PM   Rupture Type AROM   Amniotic Fluid Volume:      Amniotic Fluid Description: Clear     Induction: AROM; Oxytocin       Augmentation: None   Labor Events: None     Cervical Ripening:           Delivery Events:  Episiotomy: None   Laceration(s): None     Repaired:      Number of Repair Packets:     Suture Type and Size:       Estimated Blood Loss (ml):  ml       Delivery Date: 2019    Delivery Time: 12:29 PM  Delivery Type: Vaginal, Spontaneous  Sex:  Female     Gestational Age: 38w3d   Delivery Clinician:  Elnor Primrose  Living Status: Living   Delivery Location:              [facility]  One minute Five minutes Ten minutes   Skin color: 1   1        Heart rate: 2   2        Grimace: 2   2        Muscle tone: 2   2        Breathin   2        Totals: 9   9            Presentation: Vertex    Position:        Resuscitation Method:  Tactile Stimulation     Meconium Stained: None      Cord Information: 3 Vessels  Complications: None  Cord around:    Delayed cord clamping?  Yes  Cord clamped date/time:   Disposition of Cord Blood:      Blood Gases Sent?: No    Placenta:  Date/Time: 2019 12:36 PM  Removal: Spontaneous      Appearance: Intact     Energy Measurements:  Birth Weight: 2.59 kg      Birth Length: 48.3 cm      Head Circumference: 34.3 cm      Chest Circumference: 31.1 cm     Abdominal Girth: 30.5 cm    Other Providers:   YURIY ROWELL;SHRUTHI GALVAN;YURIY CATHERINE;MEI KHAN;JOSE DIAMOND, Obstetrician;Primary Nurse;Primary [place] Nurse;Nursery Nurse;Tech           Cord pH:  none    Episiotomy: None   Laceration(s): None     Estimated Blood Loss (ml):     Labor Events  Method: AROM; Oxytocin      Augmentation: None   Cervical Ripening:                Hospital Problems  Date Reviewed: 10/7/2015          Codes Class Noted POA    Pregnancy ICD-10-CM: Z34.90  ICD-9-CM: V22.2  2019 Unknown              Operative Vaginal Delivery - none    Group B Strep:   Lab Results   Component Value Date/Time    GrBStrep, External Negative 2019     Information for the patient's :  Shelli Mazariegos [[id number]]   No results found for: ABORH, PCTABR, PCTDIG, BILI, ABORHEXT, ABORH    No results found for: APH, APCO2, APO2, AHCO3, ABEC, ABDC, O2ST, EPHV, PCO2V, PO2V, HCO3V, EBEV, EBDV, SITE, RSCOM

## 2019-02-08 NOTE — H&P
History & Physical 
 
Name: Saurabh Zhou MRN: [id number]  SSN: [id number] YOB: 1990  Age: 30 y.o. Sex: female Subjective:  
 
Estimated Date of Delivery: 19 OB History  Para Term  AB Living 4 1 1   1 1 SAB TAB Ectopic Molar Multiple Live Births 1       0 1 # Outcome Date GA Lbr Jefry/2nd Weight Sex Delivery Anes PTL Lv  
4 Current 3A Kinnie Shaheen 3B Term 10/07/15 39w4d 04:46 / 00:44 3.07 kg M VAGINAL DELI CSE N LAKESHA  
2 SAB           
1  Ms. Toby Driscoll is admitted with pregnancy at 38w4d for induction of labor due to poor fetal growth. 5% with ac <1%. Prenatal course was normal.  Please see prenatal records for details. Past Medical History:  
Diagnosis Date  Abnormal Papanicolaou smear of cervix   
 no tx required  Asthma   
 last attack 5 months ago  Other unknown and unspecified cause of morbidity or mortality Heroin use in early pregnancy Past Surgical History:  
Procedure Laterality Date  HX OTHER SURGICAL    
 wisdom teeth  HX OTHER SURGICAL    
 d&c  Social History Occupational History  Not on file Tobacco Use  Smoking status: Current Every Day Smoker Packs/day: 0.50 Years: 2.00 Pack years: 1.00 Last attempt to quit: 3/9/2015 Years since quitting: 3.9  Smokeless tobacco: Never Used Substance and Sexual Activity  Alcohol use: No  
 Drug use: Yes Types: Heroin Comment: In early pregnancy, switched to suboxone around 20 weeks  Sexual activity: Yes  
  Partners: Male History reviewed. No pertinent family history. Allergies Allergen Reactions  Sulfa (Sulfonamide Antibiotics) Rash Prior to Admission medications Not on File Review of Systems Objective:  
 
Vitals: 
Vitals:  
 19 8228 19 6757 19 3038 19 5690 BP: 115/61 117/64  126/69 Pulse: 86 89  87 Resp: 18     
 Temp: 98.3 °F (36.8 °C) SpO2: 98% Weight:   58.5 kg (129 lb) Height:   5' 6\" (1.676 m) Physical Exam  
Constitutional: She appears well-developed. HENT:  
Head: Normocephalic. Neck: Normal range of motion. Cardiovascular: Normal rate. Pulmonary/Chest: Effort normal.  
Abdominal: Soft. Genitourinary: Vagina normal.  
Musculoskeletal: Normal range of motion. Neurological: She is alert. Cervical Exam: 2 cm dilated 70% effaced   
-2 station Presenting Part: cephalic Cervical Position: mid position Consistency: Soft Membranes: Artificial Rupture of Membranes; Amniotic Fluid: medium amount of clear fluid Fetal Heart Rate: Reactive Prenatal Labs: 
 
Lab Results Component Value Date/Time  
 Rubella, External Immune 02/25/2015 GrBStrep, External negative 09/10/2015 HBsAg, External Negative 02/25/2015 HIV, External Non reactive 02/25/2015 Gonorrhea, External NEGATIVE 02/25/2015 Chlamydia, External NEGATIVE 02/25/2015 ABO,Rh O Positive 02/25/2015 Impression/Plan: Active Problems: 
  Pregnancy (2/8/2019) Plan: Admit for induction of labor. Group B Strep negative. Not currently on suboxone (notes in pmh are from previous pregnancy.) Signed By:  Joana Sutton MD   
 February 8, 2019

## 2019-02-08 NOTE — PROGRESS NOTES
Report from [address] [place] [address], care assumed. Pt has epidural cath in and fluids finishing. Has not been up as legs are not stable. 1545 pt has no urge to void at this time, palma care and pads changed 1645 pericare and pads changed 1715 up to try and walk to bathroom for palma care, passed clot 6 x 6 inch flat pancake like. Left leg unable to support pt weight. Pt back to bed, bladder not distended. Palma care completed and pad changed. 1900 pt up to bathroom to void large amount unmeasured, gait is steady, walks without difficulty. Palma care completed, pt does not want to do a shower at this time.

## 2019-02-08 NOTE — PROGRESS NOTES
Admitted to labor and delivery for scheduled induction. Intro. Done. poc explained. Marne to rm. To br to change clothes. 1941. To bed. Efm explained and applied. Assess. Done. siderails up x 2 and call light within reach.

## 2019-02-08 NOTE — PROGRESS NOTES
TRANSFER - OUT REPORT: 
 
Verbal report given to GUSTAVO Rose(name) on Sindy Mo  being transferred to MIU(unit) for routine progression of care Report consisted of patients Situation, Background, Assessment and  
Recommendations(SBAR). Information from the following report(s) SBAR, Kardex, Procedure Summary, Intake/Output and MAR was reviewed with the receiving nurse. Lines:  
Peripheral IV 02/08/19 Left; Inner Forearm (Active) Site Assessment Clean, dry, & intact 2/8/2019  7:28 AM  
Phlebitis Assessment 0 2/8/2019  7:28 AM  
Infiltration Assessment 0 2/8/2019  7:28 AM  
Dressing Status Clean, dry, & intact 2/8/2019  7:28 AM  
Dressing Type Transparent;Tape 2/8/2019  7:28 AM  
Hub Color/Line Status Pink; Infusing 2/8/2019  7:28 AM  
Action Taken Blood drawn 2/8/2019  7:08 AM  
  
 
Opportunity for questions and clarification was provided.

## 2019-02-08 NOTE — PROGRESS NOTES
Dr. Tevin Reyes at bedside, verbal timeout done. Patient sitting.   
 
0920:  Patient supine, will monitor BP's frequently.

## 2019-02-09 PROCEDURE — 65410000002 HC RM PRIVATE OB

## 2019-02-09 PROCEDURE — 74011250637 HC RX REV CODE- 250/637: Performed by: OBSTETRICS & GYNECOLOGY

## 2019-02-09 RX ORDER — ACETAMINOPHEN 500 MG
1000 TABLET ORAL
Status: DISCONTINUED | OUTPATIENT
Start: 2019-02-09 | End: 2019-02-10 | Stop reason: HOSPADM

## 2019-02-09 RX ORDER — OXYCODONE HYDROCHLORIDE 5 MG/1
5 TABLET ORAL
Status: DISCONTINUED | OUTPATIENT
Start: 2019-02-09 | End: 2019-02-10 | Stop reason: HOSPADM

## 2019-02-09 RX ADMIN — OXYCODONE HYDROCHLORIDE 5 MG: 5 TABLET ORAL at 14:19

## 2019-02-09 RX ADMIN — ACETAMINOPHEN 1000 MG: 500 TABLET ORAL at 14:19

## 2019-02-09 RX ADMIN — HYDROCODONE BITARTRATE AND ACETAMINOPHEN 1 TABLET: 5; 325 TABLET ORAL at 02:00

## 2019-02-09 RX ADMIN — IBUPROFEN 800 MG: 400 TABLET ORAL at 23:00

## 2019-02-09 RX ADMIN — ACETAMINOPHEN 1000 MG: 500 TABLET ORAL at 20:16

## 2019-02-09 RX ADMIN — IBUPROFEN 800 MG: 400 TABLET ORAL at 06:43

## 2019-02-09 RX ADMIN — HYDROCODONE BITARTRATE AND ACETAMINOPHEN 1 TABLET: 5; 325 TABLET ORAL at 08:23

## 2019-02-09 RX ADMIN — OXYCODONE HYDROCHLORIDE 5 MG: 5 TABLET ORAL at 20:16

## 2019-02-09 RX ADMIN — IBUPROFEN 800 MG: 400 TABLET ORAL at 14:19

## 2019-02-09 NOTE — ROUTINE PROCESS
Bedside and Verbal shift change report given to KHADRA Rogel RN (oncoming nurse) by Viet Galindo RN (offgoing nurse). Report included the following information SBAR.

## 2019-02-09 NOTE — PROGRESS NOTES
Post-Partum Day Number 1 Progress Note Theodore Max Assessment: Doing well, post partum day 1, back pain. Plan: 1. Continue routine postpartum and perineal care as well as maternal education. 2. Add tylenol but if not effective will do limited use of narcotic given previous opioid abuse. Information for the patient's :  Bella Araiza [[id number]] Vaginal, Spontaneous Patient doing well without significant complaint. Voiding without difficulty, normal lochia. Vitals: 
Visit Vitals /88 (BP 1 Location: Left arm, BP Patient Position: At rest) Pulse 70 Temp 97.7 °F (36.5 °C) Resp 18 Ht 5' 6\" (1.676 m) Wt 58.5 kg (129 lb) SpO2 94% Breastfeeding? Unknown BMI 20.82 kg/m² Temp (24hrs), Av.1 °F (36.7 °C), Min:97.7 °F (36.5 °C), Max:98.7 °F (37.1 °C) Exam:   Patient without distress. Abdomen soft, fundus firm, nontender Perineum with normal lochia noted. Lower extremities are negative for swelling, cords or tenderness. Labs:  
 
Lab Results Component Value Date/Time  WBC 5.7 2019 07:09 AM  
 WBC 7.2 2018 01:23 AM  
 WBC 7.3 2017 06:05 PM  
 WBC 11.3 (H) 10/18/2015 01:48 PM  
 WBC 7.9 10/06/2015 04:41 PM  
 WBC 6.4 2015 04:17 PM  
 WBC 6.2 2015 01:54 PM  
 HGB 10.1 (L) 2019 07:09 AM  
 HGB 11.6 2018 01:23 AM  
 HGB 14.3 2017 06:05 PM  
 HGB 11.9 10/18/2015 01:48 PM  
 HGB 10.4 (L) 10/06/2015 04:41 PM  
 HGB 11.0 (L) 2015 04:17 PM  
 HGB 12.0 2015 01:54 PM  
 HCT 30.6 (L) 2019 07:09 AM  
 HCT 33.7 (L) 2018 01:23 AM  
 HCT 42.5 2017 06:05 PM  
 HCT 37.5 10/18/2015 01:48 PM  
 HCT 32.8 (L) 10/06/2015 04:41 PM  
 HCT 33.3 (L) 2015 04:17 PM  
 HCT 34.7 (L) 2015 01:54 PM  
 PLATELET 283  07:09 AM  
 PLATELET 353 15/39/5856 01:23 AM  
 PLATELET 399  06:05 PM  
 PLATELET 361  01:48 PM  
 PLATELET 031 71/51/0565 04:41 PM  
 PLATELET 724 04/01/3785 04:17 PM  
 PLATELET 716 36/60/1815 01:54 PM  
 
 
No results found for this or any previous visit (from the past 24 hour(s)).

## 2019-02-10 VITALS
SYSTOLIC BLOOD PRESSURE: 117 MMHG | TEMPERATURE: 97.7 F | BODY MASS INDEX: 20.73 KG/M2 | WEIGHT: 129 LBS | DIASTOLIC BLOOD PRESSURE: 66 MMHG | OXYGEN SATURATION: 94 % | HEIGHT: 66 IN | RESPIRATION RATE: 18 BRPM | HEART RATE: 82 BPM

## 2019-02-10 PROCEDURE — 74011250637 HC RX REV CODE- 250/637: Performed by: OBSTETRICS & GYNECOLOGY

## 2019-02-10 RX ORDER — IBUPROFEN 800 MG/1
800 TABLET ORAL
Qty: 36 TAB | Refills: 0 | Status: SHIPPED | OUTPATIENT
Start: 2019-02-10 | End: 2021-02-23

## 2019-02-10 RX ORDER — OXYCODONE HYDROCHLORIDE 5 MG/1
5 TABLET ORAL
Qty: 10 TAB | Refills: 0 | Status: SHIPPED | OUTPATIENT
Start: 2019-02-10 | End: 2021-02-23

## 2019-02-10 RX ADMIN — ACETAMINOPHEN 1000 MG: 500 TABLET ORAL at 05:38

## 2019-02-10 RX ADMIN — IBUPROFEN 800 MG: 400 TABLET ORAL at 07:41

## 2019-02-10 RX ADMIN — OXYCODONE HYDROCHLORIDE 5 MG: 5 TABLET ORAL at 05:38

## 2019-02-10 NOTE — PROGRESS NOTES
Post-Partum Day Number 2 Progress Note Doralee Duverney Assessment: Doing well, post partum day 2 Plan: 1. Discharge home today 2. Follow up in office in 6 weeks with Rio Wright 3. Post partum activity advised, diet as tolerated 4. Discharge Medications: ibuprofen, oxycodone and medications prior to admission Information for the patient's :  Rossana Frey [[id number]] Vaginal, Spontaneous Patient doing well without significant complaint. Voiding without difficulty, normal lochia. Vitals: 
Visit Vitals /66 (BP 1 Location: Left arm, BP Patient Position: At rest) Pulse 82 Temp 97.7 °F (36.5 °C) Resp 18 Ht 5' 6\" (1.676 m) Wt 58.5 kg (129 lb) SpO2 94% Breastfeeding? Unknown BMI 20.82 kg/m² Temp (24hrs), Av.9 °F (36.6 °C), Min:97.7 °F (36.5 °C), Max:98.1 °F (36.7 °C) Exam:    
    Patient without distress. Abdomen soft, fundus firm, nontender Lower extremities are negative for swelling, cords or tenderness. Labs:  
 
Lab Results Component Value Date/Time  WBC 5.7 2019 07:09 AM  
 WBC 7.2 2018 01:23 AM  
 WBC 7.3 2017 06:05 PM  
 WBC 11.3 (H) 10/18/2015 01:48 PM  
 WBC 7.9 10/06/2015 04:41 PM  
 WBC 6.4 2015 04:17 PM  
 WBC 6.2 2015 01:54 PM  
 HGB 10.1 (L) 2019 07:09 AM  
 HGB 11.6 2018 01:23 AM  
 HGB 14.3 2017 06:05 PM  
 HGB 11.9 10/18/2015 01:48 PM  
 HGB 10.4 (L) 10/06/2015 04:41 PM  
 HGB 11.0 (L) 2015 04:17 PM  
 HGB 12.0 2015 01:54 PM  
 HCT 30.6 (L) 2019 07:09 AM  
 HCT 33.7 (L) 2018 01:23 AM  
 HCT 42.5 2017 06:05 PM  
 HCT 37.5 10/18/2015 01:48 PM  
 HCT 32.8 (L) 10/06/2015 04:41 PM  
 HCT 33.3 (L) 2015 04:17 PM  
 HCT 34.7 (L) 2015 01:54 PM  
 PLATELET 958  07:09 AM  
 PLATELET 257  01:23 AM  
 PLATELET 067  06:05 PM  
 PLATELET 559  01:48 PM  
 PLATELET 685 61/86/2987 04:41 PM  
 PLATELET 011 28/01/4346 04:17 PM  
 PLATELET 381 55/28/9356 01:54 PM  
 
 
No results found for this or any previous visit (from the past 24 hour(s)).

## 2019-02-10 NOTE — DISCHARGE INSTRUCTIONS
POST DELIVERY DISCHARGE INSTRUCTIONS    Name: Jessie Stewart  YOB: 1990  Primary Diagnosis: Active Problems:    Pregnancy (2/8/2019)        General:     Diet/Diet Restrictions:  · Eight 8-ounce glasses of fluid daily (water, juices); avoid excessive caffeine intake. · Meals/snacks as desired which are high in fiber and carbohydrates and low in fat and cholesterol. Medications:   {Medication reconciliation information is now added to the patient's AVS automatically when it is printed. There is no need to use this SmartLink in discharge instructions. Highlight this text and delete it to clear this message}      Physical Activity / Restrictions / Safety:     · Avoid heavy lifting, no more that 8 lbs. For 2-3 weeks;   · Limit use of stairs to 2 times daily for the first week home. · No driving for one week. · Avoid intercourse 4-6 weeks, no douching or tampon use. · Check with obstetrician before starting or resuming an exercise program.      Discharge Instructions/Special Treatment/Home Care Needs:     · Continue prenatal vitamins. · Continue to use squirt bottle with warm water on your episiotomy after each bathroom use until bleeding stops. · If steri-strips applied to your incision, remove in 7-10 days. Call your doctor for the following:     · Fever over 101 degrees by mouth. · Vaginal bleeding heavier than a normal menstrual period or clots larger than a golf ball. · Red streaks or increased swelling of legs, painful red streaks on your breast.  · Painful urination, constipation and increased pain or swelling or discharge with your incision. · If you feel extremely anxious or overwhelmed. · If you have thoughts of harming yourself and/or your baby. Pain Management:     · Take Acetaminophen (Tylenol) or Ibuprofen (Advil, Motrin), as directed for pain. · Use a warm Sitz bath 3 times daily to relieve episiotomy or hemorrhoidal discomfort.    · For hemorrhoidal discomfort, use Tucks and Anusol cream as needed and directed. · Heating pad to  incision as needed.      Follow-Up Care:     Appointment with MD:   Follow-up Appointments   Procedures    FOLLOW UP VISIT Appointment in: 6 Weeks     Standing Status:   Standing     Number of Occurrences:   1     Order Specific Question:   Appointment in     Answer:   Bruna Agarwal     Telephone number: [phone]    Signed By: Antony Negrete MD                                                                                                   Date: 2/10/2019 Time: 10:09 AM

## 2019-02-10 NOTE — DISCHARGE SUMMARY
Obstetrical Discharge Summary     Name: Xu Becrera MRN: [id number]  SSN: [id number]    YOB: 1990  Age: 30 y.o. Sex: female      Admit Date: 2019    Discharge Date: 2/10/2019     Admitting Physician: Tomi Delarosa MD     Attending Physician:  Hemal Sal MD     Admission Diagnoses: Pregnancy [Z34.90]    Discharge Diagnoses:   Information for the patient's :  Isacc Fenton [[id number]]   Delivery of a 2.59 kg female infant via Vaginal, Spontaneous on 2019 at 12:29 PM  by . Apgars were 9 and 9. Additional Diagnoses:   Hospital Problems  Date Reviewed: 10/7/2015          Codes Class Noted POA    Pregnancy ICD-10-CM: Z34.90  ICD-9-CM: V22.2  2019 Unknown             Lab Results   Component Value Date/Time    Rubella, External immune 2018    GrBStrep, External Negative 2019       Hospital Course: Normal hospital course following the delivery. Disposition at Discharge: Home or self care    Discharged Condition: Stable    Patient Instructions:   Current Discharge Medication List      START taking these medications    Details   oxyCODONE IR (ROXICODONE) 5 mg immediate release tablet Take 1 Tab by mouth every six (6) hours as needed. Max Daily Amount: 20 mg.  Qty: 10 Tab, Refills: 0    Associated Diagnoses: Pregnancy, unspecified gestational age      ibuprofen (MOTRIN) 800 mg tablet Take 1 Tab by mouth every eight (8) hours as needed for Pain. Qty: 36 Tab, Refills: 0             Reference my discharge instructions.     Follow-up Appointments   Procedures    FOLLOW UP VISIT Appointment in: 6 Weeks     Standing Status:   Standing     Number of Occurrences:   1     Order Specific Question:   Appointment in     Answer:   6 Weeks        Signed By:  Nano Adrian MD     February 10, 2019

## 2019-02-10 NOTE — ROUTINE PROCESS
Bedside shift change report given to NEDA Montero RN by KHADRA Dominguez RN . Report given with SBAR.

## 2019-02-10 NOTE — LACTATION NOTE
Couplet Interdisciplinary Rounds MATERNAL Daily Goal:  
 
Influenza screening completed: YES  Tdap screening completed: YES Rhogam Given:N/A 
MMR Given:N/A 
 
VTE Prophylaxis: Not indicated, per Provider order EPDS:   
 
 Patient Name: Saurabh Zhou Diagnosis: Pregnancy [E86.76] Date of Admission: 2019 LOS: 2 Gestational Age: Gestational Age: <None>  
 
 
Daily Goal:  
 
Birth Weight: No birth weight on file. Current Weight: Weight: 58.5 kg (129 lb) 
% of Weight Change: Birth weight not on file Feeding: 
 Metabolic Screen: YES Hepatitis B:  YES Discharge Bili:  YES Car Seat Trial, if needed:  N/A Patient/Family Teaching Needs:  
 
Days before discharge: Ready for discharge In Attendance:  Nursing and Physician

## 2021-02-23 ENCOUNTER — HOSPITAL ENCOUNTER (EMERGENCY)
Age: 31
Discharge: HOME | End: 2021-02-23
Attending: EMERGENCY MEDICINE
Payer: COMMERCIAL

## 2021-02-23 VITALS
DIASTOLIC BLOOD PRESSURE: 81 MMHG | WEIGHT: 108.91 LBS | OXYGEN SATURATION: 99 % | RESPIRATION RATE: 20 BRPM | TEMPERATURE: 98.5 F | BODY MASS INDEX: 17.5 KG/M2 | HEART RATE: 97 BPM | HEIGHT: 66 IN | SYSTOLIC BLOOD PRESSURE: 151 MMHG

## 2021-02-23 DIAGNOSIS — F32.A ANXIETY AND DEPRESSION: ICD-10-CM

## 2021-02-23 DIAGNOSIS — F41.9 ANXIETY AND DEPRESSION: ICD-10-CM

## 2021-02-23 DIAGNOSIS — T50.905A ADVERSE EFFECT OF DRUG, INITIAL ENCOUNTER: Primary | ICD-10-CM

## 2021-02-23 LAB
ATRIAL RATE: 98 BPM
CALCULATED P AXIS, ECG09: 76 DEGREES
CALCULATED R AXIS, ECG10: 74 DEGREES
CALCULATED T AXIS, ECG11: 64 DEGREES
DIAGNOSIS, 93000: NORMAL
P-R INTERVAL, ECG05: 116 MS
Q-T INTERVAL, ECG07: 330 MS
QRS DURATION, ECG06: 76 MS
QTC CALCULATION (BEZET), ECG08: 421 MS
VENTRICULAR RATE, ECG03: 98 BPM

## 2021-02-23 PROCEDURE — 99283 EMERGENCY DEPT VISIT LOW MDM: CPT

## 2021-02-23 PROCEDURE — 93005 ELECTROCARDIOGRAM TRACING: CPT

## 2021-02-23 RX ORDER — VENLAFAXINE HYDROCHLORIDE 37.5 MG/1
37.5 CAPSULE, EXTENDED RELEASE ORAL DAILY
COMMUNITY

## 2021-02-23 NOTE — ED NOTES
Assumed care of pt from triage. Pt is A&O x 4. Pt reports CC of intermittent heart palpitations since starting Effexor on Friday. Pt denies sx at this moments. Pt in [address] at this time. No acute distress noted.

## 2021-02-23 NOTE — ED PROVIDER NOTES
EMERGENCY DEPARTMENT HISTORY AND PHYSICAL EXAM      Date: 2/23/2021  Patient Name: Kimi Jose    History of Presenting Illness     Chief Complaint   Patient presents with    Allergic Reaction     Patient reports she started effexor on Fri and has been having palpatations and sweats since. History Provided By: Patient    HPI: Kimi Jose, 28 y.o. female presents ambulatory to the ED with cc of couple of days of intermittent feelings of chest palpitations and anxiety that acutely worsened after starting Effexor on Friday. She tells me she was seen by the nurse practitioner at her psychiatrist's office on Friday and prescribed venlafaxine to help with her depression and anxiety. She tells me Saturday she had this feelings of anxiety and palpitations. Eventually the feelings went away and she continued to take the Effexor daily as prescribed. She tells me she had additional symptoms today and presents for evaluation. At the time of my examination, patient tells me she feels fine and has no symptoms of anxiety or palpitations. She tells me she did call the psychiatrist office who said they were glad that she was here and will see her this coming Friday to see if there is a new prescription they can prescribe. She convincingly denies suicidal or homicidal ideations. She has been well lately without fever. Has been no nausea, vomiting or diarrhea. There are no other complaints, changes, or physical findings at this time. PCP: UNKNOWN    Current Outpatient Medications   Medication Sig Dispense Refill    venlafaxine-SR (Effexor XR) 37.5 mg capsule Take 37.5 mg by mouth daily.        Past History     Past Medical History:  Past Medical History:   Diagnosis Date    Abnormal Papanicolaou smear of cervix     no tx required    Asthma     last attack 5 months ago    Other unknown and unspecified cause of morbidity or mortality     Heroin use in early pregnancy       Past Surgical History:  Past Surgical History:   Procedure Laterality Date    HX OTHER SURGICAL      wisdom teeth    HX OTHER SURGICAL      d&c        Family History:  No family history on file. Social History:  Social History     Tobacco Use    Smoking status: Current Every Day Smoker     Packs/day: 0.50     Years: 2.00     Pack years: 1.00     Last attempt to quit: 3/9/2015     Years since quittin.9    Smokeless tobacco: Never Used   Substance Use Topics    Alcohol use: No    Drug use: Yes     Types: Heroin     Comment: in previous pregnancy ()       Allergies: Allergies   Allergen Reactions    Sulfa (Sulfonamide Antibiotics) Rash     Review of Systems   Review of Systems   Constitutional: Negative for fatigue and fever. HENT: Negative for ear pain and sore throat. Eyes: Negative for pain, redness and visual disturbance. Respiratory: Negative for cough and shortness of breath. Cardiovascular: Positive for palpitations. Negative for chest pain. Gastrointestinal: Negative for abdominal pain, nausea and vomiting. Genitourinary: Negative for dysuria, frequency and urgency. Musculoskeletal: Negative for back pain, gait problem, neck pain and neck stiffness. Skin: Negative for rash and wound. Neurological: Negative for dizziness, weakness, light-headedness, numbness and headaches. Psychiatric/Behavioral: The patient is nervous/anxious. Physical Exam   Physical Exam  Vitals signs and nursing note reviewed. Constitutional:       General: She is not in acute distress. Appearance: She is well-developed. She is not toxic-appearing. HENT:      Head: Normocephalic and atraumatic. Jaw: No trismus. Right Ear: External ear normal.      Left Ear: External ear normal.      Nose: Nose normal.      Mouth/Throat:      Pharynx: Uvula midline. Eyes:      General: No scleral icterus. Conjunctiva/sclera: Conjunctivae normal.      Pupils: Pupils are equal, round, and reactive to light.    Neck: Musculoskeletal: Full passive range of motion without pain and normal range of motion. Cardiovascular:      Rate and Rhythm: Normal rate and regular rhythm. Pulmonary:      Effort: Pulmonary effort is normal. No tachypnea, accessory muscle usage or respiratory distress. Breath sounds: No decreased breath sounds or wheezing. Abdominal:      Palpations: Abdomen is soft. Tenderness: There is no abdominal tenderness. Musculoskeletal: Normal range of motion. Skin:     Findings: No rash. Neurological:      Mental Status: She is alert and oriented to person, place, and time. She is not disoriented. GCS: GCS eye subscore is 4. GCS verbal subscore is 5. GCS motor subscore is 6. Cranial Nerves: No cranial nerve deficit. Psychiatric:         Speech: Speech normal.       Diagnostic Study Results     Labs -     Recent Results (from the past 12 hour(s))   EKG, 12 LEAD, INITIAL    Collection Time: 02/23/21  2:37 PM   Result Value Ref Range    Ventricular Rate 98 BPM    Atrial Rate 98 BPM    P-R Interval 116 ms    QRS Duration 76 ms    Q-T Interval 330 ms    QTC Calculation (Bezet) 421 ms    Calculated P Axis 76 degrees    Calculated R Axis 74 degrees    Calculated T Axis 64 degrees    Diagnosis       Normal sinus rhythm  Nonspecific ST and T wave abnormality  No previous ECGs available  Confirmed by Steven Madera MD, Shamika Clifton ([id number]) on 2/23/2021 4:14:15 PM         Radiologic Studies -   No orders to display     CT Results  (Last 48 hours)    None        CXR Results  (Last 48 hours)    None        Medical Decision Making   I am the first provider for this patient. I reviewed the vital signs, available nursing notes, past medical history, past surgical history, family history and social history. Vital Signs-Reviewed the patient's vital signs.   Patient Vitals for the past 12 hrs:   Temp Pulse Resp BP SpO2   02/23/21 1434 98.5 °F (36.9 °C) 97 20 (!) 151/81 99 %       Pulse Oximetry Analysis - 99% on RA    Records Reviewed: Nursing Notes, Old Medical Records, Previous Radiology Studies and Previous Laboratory Studies    Provider Notes (Medical Decision Making): Afebrile; well-appearing; asymptomatic at the time of my exam.  EKG is NSR. Additional testing deferred. Patient is instructed to discontinue Effexor and to call the psychiatrist office today or tomorrow. Regardless, she tells me she has an appointment with them this Friday. Return precautions for worsening symptoms or any concerns. ED Course:   Initial assessment performed. The patients presenting problems have been discussed, and they are in agreement with the care plan formulated and outlined with them. I have encouraged them to ask questions as they arise throughout their visit. Disposition:  Discharge    PLAN:  1. Discharge Medication List as of 2/23/2021  4:17 PM        2. Follow-up Information     Follow up With Specialties Details Why Contact Info    [facility] EMERGENCY DEPT Emergency Medicine  If symptoms worsen [address] [place] [address]  [phone]        Return to ED if worse     Diagnosis     Clinical Impression:   1. Adverse effect of drug, initial encounter    2.  Anxiety and depression

## 2022-02-24 ENCOUNTER — HOSPITAL ENCOUNTER (EMERGENCY)
Age: 32
Discharge: HOME | End: 2022-02-24
Attending: EMERGENCY MEDICINE
Payer: COMMERCIAL

## 2022-02-24 ENCOUNTER — APPOINTMENT (OUTPATIENT)
Dept: GENERAL RADIOLOGY | Age: 32
End: 2022-02-24
Attending: EMERGENCY MEDICINE
Payer: COMMERCIAL

## 2022-02-24 VITALS
RESPIRATION RATE: 14 BRPM | OXYGEN SATURATION: 97 % | DIASTOLIC BLOOD PRESSURE: 72 MMHG | HEIGHT: 66 IN | BODY MASS INDEX: 20.09 KG/M2 | TEMPERATURE: 98.4 F | HEART RATE: 71 BPM | SYSTOLIC BLOOD PRESSURE: 110 MMHG | WEIGHT: 125 LBS

## 2022-02-24 DIAGNOSIS — E87.6 HYPOKALEMIA: ICD-10-CM

## 2022-02-24 DIAGNOSIS — I49.3 PREMATURE VENTRICULAR CONTRACTIONS: Primary | ICD-10-CM

## 2022-02-24 LAB
ALBUMIN SERPL-MCNC: 4.4 G/DL (ref 3.5–5)
ALBUMIN/GLOB SERPL: 1.3 {RATIO} (ref 1.1–2.2)
ALP SERPL-CCNC: 68 U/L (ref 45–117)
ALT SERPL-CCNC: 16 U/L (ref 12–78)
ANION GAP SERPL CALC-SCNC: 3 MMOL/L (ref 5–15)
AST SERPL-CCNC: 9 U/L (ref 15–37)
BASOPHILS # BLD: 0 K/UL (ref 0–0.1)
BASOPHILS NFR BLD: 1 % (ref 0–1)
BILIRUB SERPL-MCNC: 0.4 MG/DL (ref 0.2–1)
BNP SERPL-MCNC: 80 PG/ML
BUN SERPL-MCNC: 12 MG/DL (ref 6–20)
BUN/CREAT SERPL: 15 (ref 12–20)
CALCIUM SERPL-MCNC: 9.8 MG/DL (ref 8.5–10.1)
CHLORIDE SERPL-SCNC: 103 MMOL/L (ref 97–108)
CO2 SERPL-SCNC: 30 MMOL/L (ref 21–32)
CREAT SERPL-MCNC: 0.8 MG/DL (ref 0.55–1.02)
DIFFERENTIAL METHOD BLD: NORMAL
EOSINOPHIL # BLD: 0.1 K/UL (ref 0–0.4)
EOSINOPHIL NFR BLD: 3 % (ref 0–7)
ERYTHROCYTE [DISTWIDTH] IN BLOOD BY AUTOMATED COUNT: 11.8 % (ref 11.5–14.5)
GLOBULIN SER CALC-MCNC: 3.4 G/DL (ref 2–4)
GLUCOSE SERPL-MCNC: 106 MG/DL (ref 65–100)
HCT VFR BLD AUTO: 39.8 % (ref 35–47)
HGB BLD-MCNC: 13.6 G/DL (ref 11.5–16)
IMM GRANULOCYTES # BLD AUTO: 0 K/UL (ref 0–0.04)
IMM GRANULOCYTES NFR BLD AUTO: 0 % (ref 0–0.5)
LYMPHOCYTES # BLD: 1.7 K/UL (ref 0.8–3.5)
LYMPHOCYTES NFR BLD: 39 % (ref 12–49)
MAGNESIUM SERPL-MCNC: 2.1 MG/DL (ref 1.6–2.4)
MCH RBC QN AUTO: 31.7 PG (ref 26–34)
MCHC RBC AUTO-ENTMCNC: 34.2 G/DL (ref 30–36.5)
MCV RBC AUTO: 92.8 FL (ref 80–99)
MONOCYTES # BLD: 0.4 K/UL (ref 0–1)
MONOCYTES NFR BLD: 8 % (ref 5–13)
NEUTS SEG # BLD: 2.1 K/UL (ref 1.8–8)
NEUTS SEG NFR BLD: 49 % (ref 32–75)
NRBC # BLD: 0 K/UL (ref 0–0.01)
NRBC BLD-RTO: 0 PER 100 WBC
PLATELET # BLD AUTO: 208 K/UL (ref 150–400)
PMV BLD AUTO: 8.9 FL (ref 8.9–12.9)
POTASSIUM SERPL-SCNC: 3.4 MMOL/L (ref 3.5–5.1)
PROT SERPL-MCNC: 7.8 G/DL (ref 6.4–8.2)
RBC # BLD AUTO: 4.29 M/UL (ref 3.8–5.2)
SODIUM SERPL-SCNC: 136 MMOL/L (ref 136–145)
TROPONIN-HIGH SENSITIVITY: 5 NG/L (ref 0–51)
TSH SERPL DL<=0.05 MIU/L-ACNC: 5.6 UIU/ML (ref 0.36–3.74)
WBC # BLD AUTO: 4.3 K/UL (ref 3.6–11)

## 2022-02-24 PROCEDURE — 93005 ELECTROCARDIOGRAM TRACING: CPT

## 2022-02-24 PROCEDURE — 71045 X-RAY EXAM CHEST 1 VIEW: CPT

## 2022-02-24 PROCEDURE — 83735 ASSAY OF MAGNESIUM: CPT

## 2022-02-24 PROCEDURE — 85025 COMPLETE CBC W/AUTO DIFF WBC: CPT

## 2022-02-24 PROCEDURE — 74011250637 HC RX REV CODE- 250/637: Performed by: EMERGENCY MEDICINE

## 2022-02-24 PROCEDURE — 36415 COLL VENOUS BLD VENIPUNCTURE: CPT

## 2022-02-24 PROCEDURE — 84443 ASSAY THYROID STIM HORMONE: CPT

## 2022-02-24 PROCEDURE — 84484 ASSAY OF TROPONIN QUANT: CPT

## 2022-02-24 PROCEDURE — 99285 EMERGENCY DEPT VISIT HI MDM: CPT

## 2022-02-24 PROCEDURE — 83880 ASSAY OF NATRIURETIC PEPTIDE: CPT

## 2022-02-24 PROCEDURE — 80053 COMPREHEN METABOLIC PANEL: CPT

## 2022-02-24 RX ORDER — POTASSIUM CHLORIDE 750 MG/1
40 TABLET, FILM COATED, EXTENDED RELEASE ORAL
Status: COMPLETED | OUTPATIENT
Start: 2022-02-24 | End: 2022-02-24

## 2022-02-24 RX ORDER — POTASSIUM CHLORIDE 750 MG/1
20 TABLET, FILM COATED, EXTENDED RELEASE ORAL 2 TIMES DAILY
Qty: 30 TABLET | Refills: 0 | Status: SHIPPED | OUTPATIENT
Start: 2022-02-24

## 2022-02-24 RX ADMIN — POTASSIUM CHLORIDE 40 MEQ: 750 TABLET, EXTENDED RELEASE ORAL at 21:58

## 2022-02-25 LAB
ATRIAL RATE: 82 BPM
CALCULATED P AXIS, ECG09: 69 DEGREES
CALCULATED R AXIS, ECG10: 63 DEGREES
CALCULATED T AXIS, ECG11: 54 DEGREES
DIAGNOSIS, 93000: NORMAL
P-R INTERVAL, ECG05: 122 MS
Q-T INTERVAL, ECG07: 394 MS
QRS DURATION, ECG06: 94 MS
QTC CALCULATION (BEZET), ECG08: 460 MS
VENTRICULAR RATE, ECG03: 82 BPM

## 2022-02-25 NOTE — DISCHARGE INSTRUCTIONS
Please keep your scheduled appointment with your cardiologist.  Xiomara Hawkins will likely get a Holter monitor and possible echocardiogram.  You should return to the ER if you faint anyway, have any severe chest pain, or any worsening symptoms acutely. It was a pleasure taking care of you at [facility] Emergency Department today. We know that when you come to [address] [place] [address], you are entrusting us with your health, comfort, and safety. Our physicians and nurses honor that trust, and we truly appreciate the opportunity to care for you and your loved ones. We also value your feedback. If you receive a survey about your Emergency Department experience today, please fill it out. We care about our patients' feedback, and we listen to what you have to say. Thank you!

## 2022-02-25 NOTE — ED PROVIDER NOTES
EMERGENCY DEPARTMENT HISTORY AND PHYSICAL EXAM      Date: 2/24/2022  Patient Name: Shante Butler    History of Presenting Illness     Chief Complaint   Patient presents with    Chest Pain       History Provided By: Patient    HPI: Shante Butler, 33 y.o. female with a past medical history significant for medical problems as stated below presents to the ED with cc of moderate to severe substernal chest pain and palpitations over the last several days. 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 She reports she feels her heart race at times but does not get lightheaded. It does feel like a pressure sensation when she feels the palpitations. The pain does not radiate, is not exertional, has no other associated symptoms. No other exacerbating ameliorating factors. She not use stimulants, does not use illicit drugs, no significant alcohol use, no significant caffeine use or stimulant use. There are no other complaints, changes, or physical findings at this time. PCP: Unknown (Inactive)    No current facility-administered medications on file prior to encounter. Current Outpatient Medications on File Prior to Encounter   Medication Sig Dispense Refill    venlafaxine-SR (Effexor XR) 37.5 mg capsule Take 37.5 mg by mouth daily. Past History     Past Medical History:  Past Medical History:   Diagnosis Date    Abnormal Papanicolaou smear of cervix     no tx required    Asthma     last attack 5 months ago    Other unknown and unspecified cause of morbidity or mortality     Heroin use in early pregnancy       Past Surgical History:  Past Surgical History:   Procedure Laterality Date    HX OTHER SURGICAL      wisdom teeth    HX OTHER SURGICAL      d&c 2011       Family History:  No family history on file.     Social History:  Social History     Tobacco Use    Smoking status: Current Every Day Smoker     Packs/day: 0.50     Years: 2.00 Pack years: 1.00     Last attempt to quit: 3/9/2015     Years since quittin.9    Smokeless tobacco: Never Used   Substance Use Topics    Alcohol use: No    Drug use: Yes     Types: Heroin     Comment: in previous pregnancy ()       Allergies: Allergies   Allergen Reactions    Sulfa (Sulfonamide Antibiotics) Rash         Review of Systems   Review of Systems   Constitutional: Negative for chills, diaphoresis, fatigue and fever. HENT: Negative for ear pain and sore throat. Eyes: Negative for pain and redness. Respiratory: Negative for cough and shortness of breath. Cardiovascular: Positive for palpitations. Negative for chest pain and leg swelling. Gastrointestinal: Negative for abdominal pain, diarrhea, nausea and vomiting. Endocrine: Negative for cold intolerance and heat intolerance. Genitourinary: Negative for flank pain and hematuria. Musculoskeletal: Negative for back pain and neck stiffness. Skin: Negative for rash and wound. Neurological: Negative for dizziness, syncope and headaches. All other systems reviewed and are negative. Physical Exam   Physical Exam  Vitals and nursing note reviewed. Constitutional:       Appearance: She is well-developed. HENT:      Head: Normocephalic and atraumatic. Mouth/Throat:      Pharynx: No oropharyngeal exudate. Eyes:      Conjunctiva/sclera: Conjunctivae normal.      Pupils: Pupils are equal, round, and reactive to light. Cardiovascular:      Rate and Rhythm: Normal rate and regular rhythm. Heart sounds: No murmur heard. Pulmonary:      Effort: Pulmonary effort is normal. No respiratory distress. Breath sounds: Normal breath sounds. No decreased breath sounds, wheezing, rhonchi or rales. Abdominal:      General: Bowel sounds are normal. There is no distension. Palpations: Abdomen is soft. Tenderness: There is no abdominal tenderness. Musculoskeletal:         General: No deformity.  Normal range of motion. Cervical back: Normal range of motion. Skin:     General: Skin is warm and dry. Findings: No rash. Neurological:      Mental Status: She is alert and oriented to person, place, and time. Coordination: Coordination normal.   Psychiatric:         Behavior: Behavior normal.         Diagnostic Study Results     Labs -     Recent Results (from the past 24 hour(s))   EKG, 12 LEAD, INITIAL    Collection Time: 02/24/22  7:16 PM   Result Value Ref Range    Ventricular Rate 82 BPM    Atrial Rate 82 BPM    P-R Interval 122 ms    QRS Duration 94 ms    Q-T Interval 394 ms    QTC Calculation (Bezet) 460 ms    Calculated P Axis 69 degrees    Calculated R Axis 63 degrees    Calculated T Axis 54 degrees    Diagnosis       Normal sinus rhythm  Nonspecific T wave abnormality  When compared with ECG of 23-FEB-2021 14:37,  Criteria for Septal infarct are no longer present  ST no longer depressed in Inferior leads  Nonspecific T wave abnormality, worse in Anterior leads     CBC WITH AUTOMATED DIFF    Collection Time: 02/24/22  7:21 PM   Result Value Ref Range    WBC 4.3 3.6 - 11.0 K/uL    RBC 4.29 3.80 - 5.20 M/uL    HGB 13.6 11.5 - 16.0 g/dL    HCT 39.8 35.0 - 47.0 %    MCV 92.8 80.0 - 99.0 FL    MCH 31.7 26.0 - 34.0 PG    MCHC 34.2 30.0 - 36.5 g/dL    RDW 11.8 11.5 - 14.5 %    PLATELET 261 494 - 295 K/uL    MPV 8.9 8.9 - 12.9 FL    NRBC 0.0 0  WBC    ABSOLUTE NRBC 0.00 0.00 - 0.01 K/uL    NEUTROPHILS 49 32 - 75 %    LYMPHOCYTES 39 12 - 49 %    MONOCYTES 8 5 - 13 %    EOSINOPHILS 3 0 - 7 %    BASOPHILS 1 0 - 1 %    IMMATURE GRANULOCYTES 0 0.0 - 0.5 %    ABS. NEUTROPHILS 2.1 1.8 - 8.0 K/UL    ABS. LYMPHOCYTES 1.7 0.8 - 3.5 K/UL    ABS. MONOCYTES 0.4 0.0 - 1.0 K/UL    ABS. EOSINOPHILS 0.1 0.0 - 0.4 K/UL    ABS. BASOPHILS 0.0 0.0 - 0.1 K/UL    ABS. IMM.  GRANS. 0.0 0.00 - 0.04 K/UL    DF AUTOMATED     METABOLIC PANEL, COMPREHENSIVE    Collection Time: 02/24/22  7:21 PM   Result Value Ref Range Sodium 136 136 - 145 mmol/L    Potassium 3.4 (L) 3.5 - 5.1 mmol/L    Chloride 103 97 - 108 mmol/L    CO2 30 21 - 32 mmol/L    Anion gap 3 (L) 5 - 15 mmol/L    Glucose 106 (H) 65 - 100 mg/dL    BUN 12 6 - 20 MG/DL    Creatinine 0.80 0.55 - 1.02 MG/DL    BUN/Creatinine ratio 15 12 - 20      GFR est AA >60 >60 ml/min/1.73m2    GFR est non-AA >60 >60 ml/min/1.73m2    Calcium 9.8 8.5 - 10.1 MG/DL    Bilirubin, total 0.4 0.2 - 1.0 MG/DL    ALT (SGPT) 16 12 - 78 U/L    AST (SGOT) 9 (L) 15 - 37 U/L    Alk. phosphatase 68 45 - 117 U/L    Protein, total 7.8 6.4 - 8.2 g/dL    Albumin 4.4 3.5 - 5.0 g/dL    Globulin 3.4 2.0 - 4.0 g/dL    A-G Ratio 1.3 1.1 - 2.2     NT-PRO BNP    Collection Time: 02/24/22  7:21 PM   Result Value Ref Range    NT pro-BNP 80 <125 PG/ML   TROPONIN-HIGH SENSITIVITY    Collection Time: 02/24/22  7:21 PM   Result Value Ref Range    Troponin-High Sensitivity 5 0 - 51 ng/L       Radiologic Studies -   XR CHEST PORT   Final Result   No evidence of acute cardiopulmonary process. CT Results  (Last 48 hours)    None        CXR Results  (Last 48 hours)               02/24/22 1946  XR CHEST PORT Final result    Impression:  No evidence of acute cardiopulmonary process. Narrative:  INDICATION:  chest pain        FINDINGS: Single AP portable view of the chest obtained at [address] [place] [address] demonstrates a   normal cardiomediastinal silhouette. The lungs are clear bilaterally. No osseous   abnormalities are seen. Medical Decision Making   I am the first provider for this patient. I reviewed the vital signs, available nursing notes, past medical history, past surgical history, family history and social history. Vital Signs-Reviewed the patient's vital signs.   Patient Vitals for the past 12 hrs:   Temp Pulse Resp BP SpO2   02/24/22 1912 98.4 °F (36.9 °C) 94 16 125/81 100 %       Records Reviewed: Nursing records and medical records reviewed    MDM:  Patient presents with palpitations and feeling of skipping beats. DDx: Likely PVCs due to either dehydration, infection, stress, electrolyte anomaly vs. Afib vs. Aflutter with RVR, sinus tachycardia, SVT, stable VTach, anxiety. Will get labs, EKG, and treat rhythm as indicated. If ED workup unremarkable, will refer to PCP or Cardiology for outpatient Holter vs event monitor. Patient presents with CP. DDx:  ACS, Aortic dissection, PNA, PE, PTX, pericarditis, myocarditis, GERD, costochondritis, anxiety. Provider Notes (Medical Decision Making):   Patient is a young well-appearing 81-year-old female presenting with PVCs on cardiac monitoring. Her EKG, troponin, and history are not clinically suggestive of acute coronary syndrome or any other emergent cause. She likely need a work-up for palpitations and treatment of her mild hypokalemia. Follow-up as already scheduled as an outpatient with cardiology. ED Course:   Initial assessment performed. The patients presenting problems have been discussed, and they are in agreement with the care plan formulated and outlined with them. I have encouraged them to ask questions as they arise throughout their visit. Critical Care:  None      Disposition:  12:16 PM  Jen De Leonburn's  results have been reviewed with her. She has been counseled regarding her diagnosis. She verbally conveys understanding and agreement of the signs, symptoms, diagnosis, treatment and prognosis and additionally agrees to follow up as recommended with Dr. Hayder Henry (Inactive) in 24 - 48 hours. She also agrees with the care-plan and conveys that all of her questions have been answered.   I have also put together some discharge instructions for her that include: 1) educational information regarding their diagnosis, 2) how to care for their diagnosis at home, as well a 3) list of reasons why they would want to return to the ED prior to their follow-up appointment, should their condition change. DISCHARGE PLAN:  1. Discharge Medication List as of 2022  9:36 PM      START taking these medications    Details   potassium chloride SR (KLOR-CON 10) 10 mEq tablet Take 2 Tablets by mouth two (2) times a day., Normal, Disp-30 Tablet, R-0         CONTINUE these medications which have NOT CHANGED    Details   venlafaxine-SR (Effexor XR) 37.5 mg capsule Take 37.5 mg by mouth daily. , Historical Med           2. Follow-up Information     Follow up With Specialties Details Why Contact Info    Your cardiologist  In 1 week For a follow-up evaluation. [facility] EMERGENCY DEPT Emergency Medicine In 2 days If symptoms worsen [address] [place] [address]  [phone]        3. Return to ED if worse     Diagnosis     Clinical Impression:   1. Premature ventricular contractions    2. Hypokalemia        Attestations:    Halima Collins MD    Please note that this dictation was completed with [employer], the computer voice recognition software. Quite often unanticipated grammatical, syntax, homophones, and other interpretive errors are inadvertently transcribed by the computer software. Please disregard these errors. Please excuse any errors that have escaped final proofreading. Thank you.

## 2022-02-25 NOTE — ED TRIAGE NOTES
Intermittent chest pain X1 week. Seen at [facility] and was set up with cardiology but has not seen them yet. Chest pain is 4/10 tightness.

## 2022-03-20 PROBLEM — Z34.90 PREGNANCY: Status: ACTIVE | Noted: 2019-02-08

## 2023-05-17 RX ORDER — VENLAFAXINE HYDROCHLORIDE 37.5 MG/1
37.5 CAPSULE, EXTENDED RELEASE ORAL DAILY
COMMUNITY

## 2023-05-17 RX ORDER — POTASSIUM CHLORIDE 750 MG/1
20 TABLET, FILM COATED, EXTENDED RELEASE ORAL 2 TIMES DAILY
COMMUNITY
Start: 2022-02-24